# Patient Record
Sex: MALE | Race: OTHER | HISPANIC OR LATINO | Employment: FULL TIME | URBAN - METROPOLITAN AREA
[De-identification: names, ages, dates, MRNs, and addresses within clinical notes are randomized per-mention and may not be internally consistent; named-entity substitution may affect disease eponyms.]

---

## 2018-11-09 ENCOUNTER — OFFICE VISIT (OUTPATIENT)
Dept: MEDICAL GROUP | Facility: PHYSICIAN GROUP | Age: 28
End: 2018-11-09

## 2018-11-09 VITALS
OXYGEN SATURATION: 98 % | DIASTOLIC BLOOD PRESSURE: 92 MMHG | HEIGHT: 69 IN | TEMPERATURE: 100 F | HEART RATE: 104 BPM | WEIGHT: 165.8 LBS | RESPIRATION RATE: 16 BRPM | SYSTOLIC BLOOD PRESSURE: 130 MMHG | BODY MASS INDEX: 24.56 KG/M2

## 2018-11-09 DIAGNOSIS — M79.10 MYALGIA: ICD-10-CM

## 2018-11-09 DIAGNOSIS — R01.1 MURMUR: ICD-10-CM

## 2018-11-09 DIAGNOSIS — R03.0 ELEVATED BLOOD PRESSURE READING IN OFFICE WITHOUT DIAGNOSIS OF HYPERTENSION: ICD-10-CM

## 2018-11-09 DIAGNOSIS — R00.2 PALPITATIONS: ICD-10-CM

## 2018-11-09 DIAGNOSIS — R25.3 TWITCHING: ICD-10-CM

## 2018-11-09 PROBLEM — I10 ELEVATED BLOOD PRESSURE READING IN OFFICE WITH DIAGNOSIS OF HYPERTENSION: Status: RESOLVED | Noted: 2018-11-09 | Resolved: 2018-11-09

## 2018-11-09 PROBLEM — I10 ELEVATED BLOOD PRESSURE READING IN OFFICE WITH DIAGNOSIS OF HYPERTENSION: Status: ACTIVE | Noted: 2018-11-09

## 2018-11-09 PROCEDURE — 99204 OFFICE O/P NEW MOD 45 MIN: CPT | Performed by: FAMILY MEDICINE

## 2018-11-09 ASSESSMENT — PATIENT HEALTH QUESTIONNAIRE - PHQ9: CLINICAL INTERPRETATION OF PHQ2 SCORE: 0

## 2018-11-09 ASSESSMENT — PAIN SCALES - GENERAL: PAINLEVEL: NO PAIN

## 2018-11-09 NOTE — ASSESSMENT & PLAN NOTE
This is a new problem.  He states been getting daily twitching for the last 6 months. It started in his R eye intermittently for a month. It stopped and a month later he had no symptoms. Then after that he developed twitching in his R arm. He would notice it with leaning on it or randomly. Then he had an episode where he got really angry at his mom and stressed for the next couple days worried he would lose his job. After that he got twitching in his left arm, calf, R thigh, and his neck. One time he had twitching in his chin and his eye again. Those twitching lasted for a month. He would also get a tingling sensation in his L cheek, lasted a couple of days then has stopped and not returned. He started taking magnesium for twitching and noticed improvement in his eye twitching. All of his twitching has mellowed out but still present. He has also noticed twitching after exercise, the following day. He is concerned about a vitamin D deficiency and states he has a h/o that. He started taking vitamin D supplement a few weeks ago.

## 2018-11-09 NOTE — ASSESSMENT & PLAN NOTE
This is a new problem.  He describes these been getting stabbing body pains intermittently for the last 3 months. 1 time in the R hamstring, 1 time R chest, 1 time L chest, 1 time in his R toes, 1 time in his lower leg. They last only a few seconds. He states they are kind of rare.

## 2018-11-09 NOTE — ASSESSMENT & PLAN NOTE
"This is a new problem.  He states is been getting palpitations intermittently for the last 3 months. It feels like \"someone is inserting air into my chest\". Or like a skipping beat. This occurred after taking magnesium chewables. The last episode was a week ago. Prior to that, it would occur every few days. It occurs it gets really excited about something, other times randomly. He also states he has a bad diet.   "

## 2018-11-09 NOTE — PROGRESS NOTES
"Subjective:     CC:  Diagnoses of Myalgia, Palpitations, and Twitching were pertinent to this visit.    HISTORY OF THE PRESENT ILLNESS: Patient is a 28 y.o. male. This pleasant patient is here today here today to establish care and discuss multiple complaints, see details below.    Myalgia  This is a new problem.  He describes these been getting stabbing body pains intermittently for the last 3 months. 1 time in the R hamstring, 1 time R chest, 1 time L chest, 1 time in his R toes, 1 time in his lower leg. They last only a few seconds. He states they are kind of rare.     Palpitations  This is a new problem.  He states is been getting palpitations intermittently for the last 3 months. It feels like \"someone is inserting air into my chest\". Or like a skipping beat. This occurred after taking magnesium chewables. The last episode was a week ago. Prior to that, it would occur every few days. It occurs it gets really excited about something, other times randomly. He also states he has a bad diet.     Twitching  This is a new problem.  He states been getting daily twitching for the last 6 months. It started in his R eye intermittently for a month. It stopped and a month later he had no symptoms. Then after that he developed twitching in his R arm. He would notice it with leaning on it or randomly. Then he had an episode where he got really angry at his mom and stressed for the next couple days worried he would lose his job. After that he got twitching in his left arm, calf, R thigh, and his neck. One time he had twitching in his chin and his eye again. Those twitching lasted for a month. He would also get a tingling sensation in his L cheek, lasted a couple of days then has stopped and not returned. He started taking magnesium for twitching and noticed improvement in his eye twitching. All of his twitching has mellowed out but still present. He has also noticed twitching after exercise, the following day. He is concerned " "about a vitamin D deficiency and states he has a h/o that. He started taking vitamin D supplement a few weeks ago.      Allergies: Patient has no allergy information on record.    No current [facility]-ordered outpatient prescriptions on file.     No current [facility]-ordered facility-administered medications on file.        History reviewed. No pertinent past medical history.    Past Surgical History:   Procedure Laterality Date   • DENTAL EXTRACTION(S)  2004       Social History   Substance Use Topics   • Smoking status: Never Smoker   • Smokeless tobacco: Never Used   • Alcohol use 0.6 oz/week     1 Cans of beer per week       Social History     Social History Narrative   • No narrative on file       Family History   Problem Relation Age of Onset   • Diabetes Mother    • Diabetes Father    • Heart Disease Father    • No Known Problems Brother    • Cancer Maternal Grandmother         pancreatic   • Stroke Paternal Grandfather        Health Maintenance: Completed    ROS:   Gen: no fevers/chills  Eyes: no changes in vision  ENT: + sore throat  Pulm: + cough  CV: + chest pain - see hpi, + palpitations - see hpi  GI: no nausea/vomiting, no diarrhea  : no dysuria  MSk: + myalgias - see hpi  Skin: no rash  Neuro: no headaches      Objective:     Exam: Blood pressure 138/98, pulse (!) 112, temperature 37.8 °C (100 °F), temperature source Temporal, resp. rate 16, height 1.74 m (5' 8.5\"), weight 75.2 kg (165 lb 12.8 oz), SpO2 98 %. Body mass index is 24.84 kg/m².    General: Normal appearing. No distress.  HEENT: Normocephalic. Eyes conjunctiva clear lids without ptosis, pupils equal  and reactive to light accommodation, ears normal shape and contour, canals are clear bilaterally, tympanic membranes are benign, oropharynx is without erythema, edema or exudates.   Neck: Supple without JVD. Thyroid is not enlarged.  Pulmonary: Clear to ausculation.  Normal effort. No rales, ronchi, or wheezing.  Cardiovascular: Regular rate and " "rhythm without murmur. Carotid and radial pulses are intact and equal bilaterally.  Chest: nontender to palpation  Abdomen: Soft, nontender, nondistended. Normal bowel sounds. Liver and spleen are not palpable  Neurologic: Grossly nonfocal  Lymph: No cervical or supraclavicular lymph nodes are palpable  Skin: Warm and dry.  No obvious lesions.  Musculoskeletal: Normal gait. No extremity cyanosis, clubbing, or edema.  Psych: Normal mood and affect. Alert and oriented x3. Judgment and insight is normal.    Assessment & Plan:   28 y.o. male with the following -    1. Myalgia  This is a new problem.  He started getting sharp stabbing pains intermittently in different parts of his body approximately 3 months ago.  He describes that one episode was in the right hamstring, one episode in his right chest, one episode in his left chest, one episode in his right toes, and one episode in his right lower leg along the tibia.  These episodes only last for a few seconds and he states they are fairly rare and they seem to be improving.  I cannot reproduce the chest pain with palpation today.  We discussed getting lab work  including magnesium and creatine kinase.  He would like to start with just the basic lab work and magnesium and hold off on the creatine kinase for now.  - COMP METABOLIC PANEL; Future  - MAGNESIUM; Future  - VITAMIN D,25 HYDROXY; Future    2. Palpitations  This is a new problem.  For the last 3 months he has been getting the intermittent sensation of his heart \"skipping a beat\" or \"someone is inserting air into my chest\".  He started taking magnesium for the twitching (see below) and then switched to a chewable version of the magnesium and after that he developed these heart palpitations.  His last episode was a week ago but prior to that they were occurring every few days.  He does note that he will have that skipping beat sensation if he gets really excited about something but other times it is random.  He " thinks it may be related to his very poor diet as he admits he does not have a healthy diet.  He did have an elevated heart rate initially on exam today at 112.  At the end of the visit his heart rate was still elevated at 104 but improved.  He admits that he is a little anxious/nervous about the doctor appointment which might be contributing to his tachycardia.  We will continue to monitor closely.    3. Twitching  This is a new problem with a fairly complicated story.  Approximately 6 months ago he noticed that his right eye would twitch and it did this for about a month.  It then stopped and it lasted for a month without any twitching and then he developed twitching in his right arm.  This twitching would occur when he was leaning on it or more randomly.  Then following that, he got very angry with his mother as she was going to make him late for work and he became very stressed/worried about losing his job from being late to work.  Following that episode he then developed twitching in his left arm, left calf, right thigh, and his neck that was intermittent and would move around.  He also developed twitching in his chin and his eye again.  These lasted for a month.  He started taking magnesium as stated above and he noticed improvement in his eye twitching as he has not had any eye twitching since in the last month.  He still gets intermittent twitching in other body parts but he states that it slowed down and seems to be improving on its own.  He also did describe some left cheek tingling that lasted for a couple days and then went away and has not returned.  He will also note that the twitching occurs after exercise the following day.  He is concerned that he might have a vitamin D deficiency as he had that in the past and he thinks this might be a symptom of it.  We will check a magnesium level and vitamin D level.  - MAGNESIUM; Future  - VITAMIN D,25 HYDROXY; Future    4. Murmur  This was noted on exam today.   He has never had a murmur mentioned to him before.  He denies any chest pain, shortness of breath, dizziness/lightheadedness/syncope.  We will continue to monitor closely.    5.  Elevated blood pressure reading in office without diagnosis of hypertension  This was noted on exam today.  His initial blood pressure reading was 138/90 and repeat at the end of the visit was still elevated at 130/92.  He states that he checks his blood pressure periodically at the pharmacy and that it is usually on the high side of normal.  We will continue to monitor this closely.    6. Uninsured  Do to the fact that he is uninsured we discussed that he should call around to the different lab facilities to find the best price to get his lab work done.  Additionally we discussed that there is [facility] and [facility] which are federally qualified [facility] that can provide a sliding fee scale for any services provided which might be more affordable than coming here.  In light of this, we will hold off on scheduling a follow-up appointment today.  I will give him his lab results when they appear in my chart.  For now he is going to see if he can go to the other clinics but schedule with us later if he needs to.  The plan is to have him follow-up in a couple months to ensure resolution/improvement of his symptoms.    Please note that this dictation was created using voice recognition software. I have made every reasonable attempt to correct obvious errors, but I expect that there are errors of grammar and possibly content that I did not discover before finalizing the note.

## 2018-11-17 LAB
25(OH)D3+25(OH)D2 SERPL-MCNC: 17.6 NG/ML (ref 30–100)
ALBUMIN SERPL-MCNC: 5 G/DL (ref 3.5–5.5)
ALBUMIN/GLOB SERPL: 1.8 {RATIO} (ref 1.2–2.2)
ALP SERPL-CCNC: 75 IU/L (ref 39–117)
ALT SERPL-CCNC: 45 IU/L (ref 0–44)
AST SERPL-CCNC: 27 IU/L (ref 0–40)
BILIRUB SERPL-MCNC: 0.6 MG/DL (ref 0–1.2)
BUN SERPL-MCNC: 9 MG/DL (ref 6–20)
BUN/CREAT SERPL: 12 (ref 9–20)
CALCIUM SERPL-MCNC: 10 MG/DL (ref 8.7–10.2)
CHLORIDE SERPL-SCNC: 100 MMOL/L (ref 96–106)
CO2 SERPL-SCNC: 26 MMOL/L (ref 20–29)
CREAT SERPL-MCNC: 0.75 MG/DL (ref 0.76–1.27)
GLOBULIN SER CALC-MCNC: 2.8 G/DL (ref 1.5–4.5)
GLUCOSE SERPL-MCNC: 106 MG/DL (ref 65–99)
IF AFRICAN AMERICAN  100797: 142 ML/MIN/1.73
IF NON AFRICAN AMER 100791: 123 ML/MIN/1.73
MAGNESIUM SERPL-MCNC: 2.1 MG/DL (ref 1.6–2.3)
POTASSIUM SERPL-SCNC: 4.2 MMOL/L (ref 3.5–5.2)
PROT SERPL-MCNC: 7.8 G/DL (ref 6–8.5)
SODIUM SERPL-SCNC: 141 MMOL/L (ref 134–144)

## 2020-09-10 ENCOUNTER — APPOINTMENT (OUTPATIENT)
Dept: RADIOLOGY | Facility: MEDICAL CENTER | Age: 30
End: 2020-09-10
Attending: EMERGENCY MEDICINE

## 2020-09-10 ENCOUNTER — HOSPITAL ENCOUNTER (OUTPATIENT)
Facility: MEDICAL CENTER | Age: 30
End: 2020-09-11
Attending: EMERGENCY MEDICINE | Admitting: HOSPITALIST

## 2020-09-10 DIAGNOSIS — R55 NEAR SYNCOPE: ICD-10-CM

## 2020-09-10 DIAGNOSIS — R00.0 TACHYCARDIA: ICD-10-CM

## 2020-09-10 DIAGNOSIS — R00.2 PALPITATIONS: ICD-10-CM

## 2020-09-10 DIAGNOSIS — R42 LIGHTHEADEDNESS: ICD-10-CM

## 2020-09-10 LAB
ALBUMIN SERPL BCP-MCNC: 4.9 G/DL (ref 3.2–4.9)
ALBUMIN/GLOB SERPL: 1.6 G/DL
ALP SERPL-CCNC: 81 U/L (ref 30–99)
ALT SERPL-CCNC: 54 U/L (ref 2–50)
AMPHET UR QL SCN: NEGATIVE
ANION GAP SERPL CALC-SCNC: 18 MMOL/L (ref 7–16)
AST SERPL-CCNC: 28 U/L (ref 12–45)
BARBITURATES UR QL SCN: NEGATIVE
BASOPHILS # BLD AUTO: 0.8 % (ref 0–1.8)
BASOPHILS # BLD: 0.07 K/UL (ref 0–0.12)
BENZODIAZ UR QL SCN: NEGATIVE
BILIRUB SERPL-MCNC: 0.3 MG/DL (ref 0.1–1.5)
BUN SERPL-MCNC: 12 MG/DL (ref 8–22)
BZE UR QL SCN: NEGATIVE
CALCIUM SERPL-MCNC: 9.8 MG/DL (ref 8.4–10.2)
CANNABINOIDS UR QL SCN: NEGATIVE
CHLORIDE SERPL-SCNC: 97 MMOL/L (ref 96–112)
CO2 SERPL-SCNC: 23 MMOL/L (ref 20–33)
CREAT SERPL-MCNC: 0.69 MG/DL (ref 0.5–1.4)
D DIMER PPP IA.FEU-MCNC: <0.27 UG/ML (FEU) (ref 0–0.5)
EKG IMPRESSION: NORMAL
EOSINOPHIL # BLD AUTO: 0.17 K/UL (ref 0–0.51)
EOSINOPHIL NFR BLD: 1.9 % (ref 0–6.9)
ERYTHROCYTE [DISTWIDTH] IN BLOOD BY AUTOMATED COUNT: 40.3 FL (ref 35.9–50)
GLOBULIN SER CALC-MCNC: 3.1 G/DL (ref 1.9–3.5)
GLUCOSE SERPL-MCNC: 163 MG/DL (ref 65–99)
HCT VFR BLD AUTO: 46.1 % (ref 42–52)
HGB BLD-MCNC: 15.9 G/DL (ref 14–18)
IMM GRANULOCYTES # BLD AUTO: 0.05 K/UL (ref 0–0.11)
IMM GRANULOCYTES NFR BLD AUTO: 0.6 % (ref 0–0.9)
LYMPHOCYTES # BLD AUTO: 2.94 K/UL (ref 1–4.8)
LYMPHOCYTES NFR BLD: 33.1 % (ref 22–41)
MAGNESIUM SERPL-MCNC: 1.9 MG/DL (ref 1.5–2.5)
MCH RBC QN AUTO: 30.2 PG (ref 27–33)
MCHC RBC AUTO-ENTMCNC: 34.5 G/DL (ref 33.7–35.3)
MCV RBC AUTO: 87.5 FL (ref 81.4–97.8)
METHADONE UR QL SCN: NEGATIVE
MONOCYTES # BLD AUTO: 0.64 K/UL (ref 0–0.85)
MONOCYTES NFR BLD AUTO: 7.2 % (ref 0–13.4)
NEUTROPHILS # BLD AUTO: 5.02 K/UL (ref 1.82–7.42)
NEUTROPHILS NFR BLD: 56.4 % (ref 44–72)
NRBC # BLD AUTO: 0 K/UL
NRBC BLD-RTO: 0 /100 WBC
OPIATES UR QL SCN: NEGATIVE
OXYCODONE UR QL SCN: NEGATIVE
PCP UR QL SCN: NEGATIVE
PLATELET # BLD AUTO: 279 K/UL (ref 164–446)
PMV BLD AUTO: 10.2 FL (ref 9–12.9)
POTASSIUM SERPL-SCNC: 3.3 MMOL/L (ref 3.6–5.5)
PROPOXYPH UR QL SCN: NEGATIVE
PROT SERPL-MCNC: 8 G/DL (ref 6–8.2)
RBC # BLD AUTO: 5.27 M/UL (ref 4.7–6.1)
SODIUM SERPL-SCNC: 138 MMOL/L (ref 135–145)
T4 FREE SERPL-MCNC: 1.22 NG/DL (ref 0.93–1.7)
TROPONIN T SERPL-MCNC: <6 NG/L (ref 6–19)
TSH SERPL DL<=0.005 MIU/L-ACNC: 0.91 UIU/ML (ref 0.38–5.33)
WBC # BLD AUTO: 8.9 K/UL (ref 4.8–10.8)

## 2020-09-10 PROCEDURE — 83036 HEMOGLOBIN GLYCOSYLATED A1C: CPT

## 2020-09-10 PROCEDURE — 85025 COMPLETE CBC W/AUTO DIFF WBC: CPT

## 2020-09-10 PROCEDURE — 700105 HCHG RX REV CODE 258: Performed by: EMERGENCY MEDICINE

## 2020-09-10 PROCEDURE — 71045 X-RAY EXAM CHEST 1 VIEW: CPT

## 2020-09-10 PROCEDURE — 99285 EMERGENCY DEPT VISIT HI MDM: CPT

## 2020-09-10 PROCEDURE — 85379 FIBRIN DEGRADATION QUANT: CPT

## 2020-09-10 PROCEDURE — 36415 COLL VENOUS BLD VENIPUNCTURE: CPT

## 2020-09-10 PROCEDURE — 84439 ASSAY OF FREE THYROXINE: CPT

## 2020-09-10 PROCEDURE — 80061 LIPID PANEL: CPT

## 2020-09-10 PROCEDURE — 93005 ELECTROCARDIOGRAM TRACING: CPT | Performed by: EMERGENCY MEDICINE

## 2020-09-10 PROCEDURE — 80307 DRUG TEST PRSMV CHEM ANLYZR: CPT

## 2020-09-10 PROCEDURE — 84484 ASSAY OF TROPONIN QUANT: CPT

## 2020-09-10 PROCEDURE — 700111 HCHG RX REV CODE 636 W/ 250 OVERRIDE (IP): Performed by: EMERGENCY MEDICINE

## 2020-09-10 PROCEDURE — 83735 ASSAY OF MAGNESIUM: CPT

## 2020-09-10 PROCEDURE — 96374 THER/PROPH/DIAG INJ IV PUSH: CPT

## 2020-09-10 PROCEDURE — 80053 COMPREHEN METABOLIC PANEL: CPT

## 2020-09-10 PROCEDURE — 84443 ASSAY THYROID STIM HORMONE: CPT

## 2020-09-10 RX ORDER — SODIUM CHLORIDE 9 MG/ML
1000 INJECTION, SOLUTION INTRAVENOUS ONCE
Status: COMPLETED | OUTPATIENT
Start: 2020-09-10 | End: 2020-09-10

## 2020-09-10 RX ORDER — LORAZEPAM 2 MG/ML
0.5 INJECTION INTRAMUSCULAR ONCE
Status: COMPLETED | OUTPATIENT
Start: 2020-09-10 | End: 2020-09-10

## 2020-09-10 RX ADMIN — SODIUM CHLORIDE 1000 ML: 9 INJECTION, SOLUTION INTRAVENOUS at 22:15

## 2020-09-10 RX ADMIN — LORAZEPAM 0.5 MG: 2 INJECTION INTRAMUSCULAR; INTRAVENOUS at 22:15

## 2020-09-11 ENCOUNTER — APPOINTMENT (OUTPATIENT)
Dept: CARDIOLOGY | Facility: MEDICAL CENTER | Age: 30
End: 2020-09-11
Attending: HOSPITALIST

## 2020-09-11 VITALS
BODY MASS INDEX: 26.73 KG/M2 | DIASTOLIC BLOOD PRESSURE: 75 MMHG | WEIGHT: 176.37 LBS | SYSTOLIC BLOOD PRESSURE: 115 MMHG | RESPIRATION RATE: 18 BRPM | HEIGHT: 68 IN | HEART RATE: 92 BPM | OXYGEN SATURATION: 93 % | TEMPERATURE: 98.4 F

## 2020-09-11 PROBLEM — E87.6 HYPOKALEMIA: Status: ACTIVE | Noted: 2020-09-11

## 2020-09-11 PROBLEM — R73.09 ELEVATED GLUCOSE: Status: ACTIVE | Noted: 2020-09-11

## 2020-09-11 PROBLEM — R00.0 SINUS TACHYCARDIA: Status: ACTIVE | Noted: 2020-09-11

## 2020-09-11 PROBLEM — R00.0 SINUS TACHYCARDIA: Status: RESOLVED | Noted: 2020-09-11 | Resolved: 2020-09-11

## 2020-09-11 PROBLEM — R07.89 ATYPICAL CHEST PAIN: Status: ACTIVE | Noted: 2020-09-11

## 2020-09-11 LAB
CHOLEST SERPL-MCNC: 246 MG/DL (ref 100–199)
COVID ORDER STATUS COVID19: NORMAL
EST. AVERAGE GLUCOSE BLD GHB EST-MCNC: 126 MG/DL
HBA1C MFR BLD: 6 % (ref 0–5.6)
HDLC SERPL-MCNC: 31 MG/DL
LDLC SERPL CALC-MCNC: ABNORMAL MG/DL
LV EJECT FRACT  99904: 60
LV EJECT FRACT MOD 2C 99903: 60.99
LV EJECT FRACT MOD 4C 99902: 55.49
LV EJECT FRACT MOD BP 99901: 59.01
SARS-COV-2 RNA RESP QL NAA+PROBE: NOTDETECTED
SPECIMEN SOURCE: NORMAL
TRIGL SERPL-MCNC: 419 MG/DL (ref 0–149)
TROPONIN T SERPL-MCNC: 7 NG/L (ref 6–19)

## 2020-09-11 PROCEDURE — 700102 HCHG RX REV CODE 250 W/ 637 OVERRIDE(OP): Performed by: NURSE PRACTITIONER

## 2020-09-11 PROCEDURE — U0003 INFECTIOUS AGENT DETECTION BY NUCLEIC ACID (DNA OR RNA); SEVERE ACUTE RESPIRATORY SYNDROME CORONAVIRUS 2 (SARS-COV-2) (CORONAVIRUS DISEASE [COVID-19]), AMPLIFIED PROBE TECHNIQUE, MAKING USE OF HIGH THROUGHPUT TECHNOLOGIES AS DESCRIBED BY CMS-2020-01-R: HCPCS

## 2020-09-11 PROCEDURE — G0378 HOSPITAL OBSERVATION PER HR: HCPCS

## 2020-09-11 PROCEDURE — 700102 HCHG RX REV CODE 250 W/ 637 OVERRIDE(OP): Performed by: HOSPITALIST

## 2020-09-11 PROCEDURE — 93306 TTE W/DOPPLER COMPLETE: CPT

## 2020-09-11 PROCEDURE — C9803 HOPD COVID-19 SPEC COLLECT: HCPCS | Performed by: HOSPITALIST

## 2020-09-11 PROCEDURE — 99236 HOSP IP/OBS SAME DATE HI 85: CPT | Performed by: HOSPITALIST

## 2020-09-11 PROCEDURE — 93306 TTE W/DOPPLER COMPLETE: CPT | Mod: 26 | Performed by: INTERNAL MEDICINE

## 2020-09-11 PROCEDURE — 700101 HCHG RX REV CODE 250: Performed by: HOSPITALIST

## 2020-09-11 PROCEDURE — A9270 NON-COVERED ITEM OR SERVICE: HCPCS | Performed by: HOSPITALIST

## 2020-09-11 PROCEDURE — A9270 NON-COVERED ITEM OR SERVICE: HCPCS | Performed by: NURSE PRACTITIONER

## 2020-09-11 PROCEDURE — 84484 ASSAY OF TROPONIN QUANT: CPT

## 2020-09-11 RX ORDER — METOPROLOL TARTRATE 1 MG/ML
5 INJECTION, SOLUTION INTRAVENOUS ONCE
Status: COMPLETED | OUTPATIENT
Start: 2020-09-11 | End: 2020-09-11

## 2020-09-11 RX ORDER — AMOXICILLIN 250 MG
2 CAPSULE ORAL 2 TIMES DAILY
Status: DISCONTINUED | OUTPATIENT
Start: 2020-09-11 | End: 2020-09-11 | Stop reason: HOSPADM

## 2020-09-11 RX ORDER — POLYETHYLENE GLYCOL 3350 17 G/17G
1 POWDER, FOR SOLUTION ORAL
Status: DISCONTINUED | OUTPATIENT
Start: 2020-09-11 | End: 2020-09-11 | Stop reason: HOSPADM

## 2020-09-11 RX ORDER — POTASSIUM CHLORIDE 20 MEQ/1
40 TABLET, EXTENDED RELEASE ORAL ONCE
Status: COMPLETED | OUTPATIENT
Start: 2020-09-11 | End: 2020-09-11

## 2020-09-11 RX ORDER — BISACODYL 10 MG
10 SUPPOSITORY, RECTAL RECTAL
Status: DISCONTINUED | OUTPATIENT
Start: 2020-09-11 | End: 2020-09-11 | Stop reason: HOSPADM

## 2020-09-11 RX ORDER — SIMVASTATIN 20 MG
20 TABLET ORAL EVERY EVENING
Qty: 30 TAB | Refills: 11 | Status: SHIPPED
Start: 2020-09-11 | End: 2020-10-26 | Stop reason: SINTOL

## 2020-09-11 RX ORDER — SIMVASTATIN 20 MG
20 TABLET ORAL EVERY EVENING
Status: DISCONTINUED | OUTPATIENT
Start: 2020-09-11 | End: 2020-09-11 | Stop reason: HOSPADM

## 2020-09-11 RX ORDER — ACETAMINOPHEN 325 MG/1
650 TABLET ORAL EVERY 6 HOURS PRN
Status: DISCONTINUED | OUTPATIENT
Start: 2020-09-11 | End: 2020-09-11 | Stop reason: HOSPADM

## 2020-09-11 RX ADMIN — METOPROLOL TARTRATE 5 MG: 1 INJECTION, SOLUTION INTRAVENOUS at 02:21

## 2020-09-11 RX ADMIN — POTASSIUM CHLORIDE 40 MEQ: 1500 TABLET, EXTENDED RELEASE ORAL at 09:10

## 2020-09-11 RX ADMIN — ASPIRIN 81 MG: 81 TABLET, COATED ORAL at 01:40

## 2020-09-11 SDOH — HEALTH STABILITY: MENTAL HEALTH: HOW OFTEN DO YOU HAVE A DRINK CONTAINING ALCOHOL?: MONTHLY OR LESS

## 2020-09-11 SDOH — ECONOMIC STABILITY: FOOD INSECURITY: WITHIN THE PAST 12 MONTHS, YOU WORRIED THAT YOUR FOOD WOULD RUN OUT BEFORE YOU GOT MONEY TO BUY MORE.: NEVER TRUE

## 2020-09-11 SDOH — ECONOMIC STABILITY: FOOD INSECURITY: WITHIN THE PAST 12 MONTHS, THE FOOD YOU BOUGHT JUST DIDN'T LAST AND YOU DIDN'T HAVE MONEY TO GET MORE.: NEVER TRUE

## 2020-09-11 SDOH — ECONOMIC STABILITY: TRANSPORTATION INSECURITY
IN THE PAST 12 MONTHS, HAS THE LACK OF TRANSPORTATION KEPT YOU FROM MEDICAL APPOINTMENTS OR FROM GETTING MEDICATIONS?: NO

## 2020-09-11 SDOH — ECONOMIC STABILITY: TRANSPORTATION INSECURITY
IN THE PAST 12 MONTHS, HAS LACK OF TRANSPORTATION KEPT YOU FROM MEETINGS, WORK, OR FROM GETTING THINGS NEEDED FOR DAILY LIVING?: NO

## 2020-09-11 ASSESSMENT — COGNITIVE AND FUNCTIONAL STATUS - GENERAL
MOBILITY SCORE: 24
SUGGESTED CMS G CODE MODIFIER MOBILITY: CH
SUGGESTED CMS G CODE MODIFIER DAILY ACTIVITY: CH
DAILY ACTIVITIY SCORE: 24

## 2020-09-11 ASSESSMENT — LIFESTYLE VARIABLES
HAVE YOU EVER FELT YOU SHOULD CUT DOWN ON YOUR DRINKING: NO
TOTAL SCORE: 0
ON A TYPICAL DAY WHEN YOU DRINK ALCOHOL HOW MANY DRINKS DO YOU HAVE: 0
TOTAL SCORE: 0
EVER HAD A DRINK FIRST THING IN THE MORNING TO STEADY YOUR NERVES TO GET RID OF A HANGOVER: NO
EVER FELT BAD OR GUILTY ABOUT YOUR DRINKING: NO
ALCOHOL_USE: NO
AVERAGE NUMBER OF DAYS PER WEEK YOU HAVE A DRINK CONTAINING ALCOHOL: 0
HOW MANY TIMES IN THE PAST YEAR HAVE YOU HAD 5 OR MORE DRINKS IN A DAY: 0
CONSUMPTION TOTAL: NEGATIVE
TOTAL SCORE: 0
HAVE PEOPLE ANNOYED YOU BY CRITICIZING YOUR DRINKING: NO

## 2020-09-11 ASSESSMENT — ENCOUNTER SYMPTOMS
NAUSEA: 0
SHORTNESS OF BREATH: 0
FEVER: 0
INSOMNIA: 0
NECK PAIN: 0
DEPRESSION: 0
SORE THROAT: 0
HEADACHES: 0
COUGH: 0
BRUISES/BLEEDS EASILY: 0
DOUBLE VISION: 0
BLURRED VISION: 0
VOMITING: 0
DIZZINESS: 0
MYALGIAS: 0
WEAKNESS: 0
PALPITATIONS: 1

## 2020-09-11 ASSESSMENT — PATIENT HEALTH QUESTIONNAIRE - PHQ9
1. LITTLE INTEREST OR PLEASURE IN DOING THINGS: NOT AT ALL
SUM OF ALL RESPONSES TO PHQ9 QUESTIONS 1 AND 2: 0
2. FEELING DOWN, DEPRESSED, IRRITABLE, OR HOPELESS: NOT AT ALL

## 2020-09-11 NOTE — CARE PLAN
Problem: Knowledge Deficit  Goal: Knowledge of disease process/condition, treatment plan, diagnostic tests, and medications will improve  Outcome: PROGRESSING AS EXPECTED  Note: Pt has new diagnosis of high cholesterol and starting on statins; needing extensive education on dietary choices.     Problem: Discharge Barriers/Planning  Goal: Patient's continuum of care needs will be met  Outcome: PROGRESSING AS EXPECTED  Note: Pt currently does not insurance and has not been to pcp in over 2 years; giving options for fee for services clinics in the area.

## 2020-09-11 NOTE — ASSESSMENT & PLAN NOTE
-Observation status on telemetry.  -With persistent sinus tachycardia at 135 bpm-in spite of IV fluids and Ativan  -Atypical chest pain likely due to palpitations and fast heart rate.  -I am checking serial cardiac enzymes every 4 hours x3.  -D-dimer negative.  -Received 1 round of Ativan as well  -Ordered echocardiogram in the morning

## 2020-09-11 NOTE — CARE PLAN
Problem: Communication  Goal: The ability to communicate needs accurately and effectively will improve  Outcome: PROGRESSING AS EXPECTED  Note: AO4 able to verbalize needs clearly. Demonstrates appropriate use of call light.        Problem: Knowledge Deficit  Goal: Knowledge of disease process/condition, treatment plan, diagnostic tests, and medications will improve  Outcome: PROGRESSING AS EXPECTED  Note:   Review plan of care including IV and medications, labs and tests, and discharge planning. Take extra time to reiterate to patients that they may ask questions at any time and should always let staff know if they are having difficulty breathing, pain or any discomfort at any time.

## 2020-09-11 NOTE — PROGRESS NOTES
"0130: Pt arrived to unit via gurney. Ambulated from gurney to bed by self. Tele monitor applied, vitals taken. Pt assessed. A&O x4. Admit profile and med rec complete. Discussed POC with pt, including tele, echo in AM. Welcome folder provided and discussed. Communication board filled out. Questions and concerns addressed, verbalized understanding. Fall precautions in place. Pt demonstrates ability to use call light appropriately.     Pt c/o mild dizziness when walking, no pain or SOB. On tele is ST 130s-140s, c/o \"pounding heart.\"    0221: IV metoprolol given.   0245: Heart rate has decreased after metoprolol, no SR ST 90s-100s. Pt reports feeling like heart is not pounding anymore. No c/o dizziness at this time.   0700: Report given to LEBRON Roberts.     "

## 2020-09-11 NOTE — ED TRIAGE NOTES
Pt  went out to walk with his brother tonight.  went didn't walk very far and started to feel dizzy and then his heart started pounding. States the episode only lasted 30 seconds but got worried so called the ambulance. Pt  has hx of palpitations but feels recently it has gotten worse. Denies drug or alcohol use

## 2020-09-11 NOTE — PROGRESS NOTES
Telemetry Shift Summary    Rhythm: SR ST  HR Range: 90-140s  Ectopy: Per Monitor Tech [place]:    No ectopy    Measurements for strip printed 9/11/2020 at 0301:        0.20/0.08/0.32        Normal Values  Rhythm SR  HR Range    Measurements 0.12-0.20 / 0.06-0.10  / 0.30-0.52

## 2020-09-11 NOTE — ASSESSMENT & PLAN NOTE
-On admission, glucose is 163.  I believe this is reactive, if remains elevated consider hemoglobin A1c.  No underlying history of type 2 diabetes.

## 2020-09-11 NOTE — DISCHARGE SUMMARY
Discharge Summary    CHIEF COMPLAINT ON ADMISSION  Chief Complaint   Patient presents with    Rapid Heart Beat    Dizziness       Reason for Admission  ems     Admission Date  9/10/2020    CODE STATUS  Full Code    HPI & HOSPITAL COURSE  This is a 29 y.o. male here with rapid heartbeat and dizziness.  Patient has no significant past medical history.  Patient presented to the emergency room after he states he developed dizziness while walking around his apartment.  Patient states dizziness increased significantly and he decided to call for an ambulance when he felt he was going to pass out.  Patient stated minimal chest pressure during episode.  On arrival in the emergency room patient was noted to have elevated heart rate ranging from 128 to 135 bpm.  Patient was provided 1 L IV fluid hydration and a half a milligram IV Ativan.  CBC was obtained which was essentially benign and noncontributory.  BMP noted to have slightly decreased potassium at 3.3 and elevated glucose at 163.  Troponin was negative,  d-dimer was negative.  EKG was obtained which shows sinus tachycardia with no acute ST elevation.  Patient was admitted to medical telemetry floor.  Patient was placed on telemetry monitoring with no acute cardiac events noted.  Patient's potassium was replaced p.o. lipid panel was obtained which showed markedly elevated triglycerides at 419 and elevated total cholesterol at 246.  Patient was initiated on statin therapy.  Echocardiogram was obtained which showed an EF of 60% otherwise normal.  COVID swab was negative.  TSH and free T4 within normal limits.  Patient is had no further episodes during hospitalization.  Signs are stable and patient is up ambulating independently.  Recommended patient follow-up with cardiology for possible Holter monitoring as patient states he has had previous dizzy episodes at home.  Patient need to follow-up with PCP for referral to cardiology.        Therefore, he is discharged in good  and stable condition to home with close outpatient follow-up.        Discharge Date  9/11/20    FOLLOW UP ITEMS POST DISCHARGE  Please follow up with PCP  Obtain referral for Cardiology for Holter monitor to assess for cardiac arrhythmias  Make dietary and lifestyle modifications to help with cholesterol levels.      DISCHARGE DIAGNOSES  Principal Problem:    Atypical chest pain POA: Unknown  Active Problems:    Sinus tachycardia POA: Unknown    Elevated glucose POA: Unknown    Hypokalemia POA: Unknown  Resolved Problems:    * No resolved hospital problems. *      FOLLOW UP    Roseanna Welsh M.D.  [address]  [facility] [address]  [phone]    In 1 week        MEDICATIONS ON DISCHARGE     Medication List        START taking these medications        Instructions   simvastatin 20 MG Tabs  Commonly known as: ZOCOR   Take 1 Tab by mouth every evening.  Dose: 20 mg              Allergies  No Known Allergies    DIET  Orders Placed This Encounter   Procedures    Diet Order Cardiac     Standing Status:   Standing     Number of Occurrences:   1     Order Specific Question:   Diet:     Answer:   Cardiac [6]       ACTIVITY  As tolerated.  Weight bearing as tolerated    CONSULTATIONS  None     PROCEDURES  None     LABORATORY  Lab Results   Component Value Date    SODIUM 138 09/10/2020    POTASSIUM 3.3 (L) 09/10/2020    CHLORIDE 97 09/10/2020    CO2 23 09/10/2020    GLUCOSE 163 (H) 09/10/2020    BUN 12 09/10/2020    CREATININE 0.69 09/10/2020        Lab Results   Component Value Date    WBC 8.9 09/10/2020    HEMOGLOBIN 15.9 09/10/2020    HEMATOCRIT 46.1 09/10/2020    PLATELETCT 279 09/10/2020        Total time of the discharge process exceeds 38 minutes.

## 2020-09-11 NOTE — DISCHARGE INSTRUCTIONS
Discharge Instructions    Discharged to home by car with relative. Discharged via walking, hospital escort: Yes.  Special equipment needed: Not Applicable    Be sure to schedule a follow-up appointment with your primary care doctor or any specialists as instructed.     Discharge Plan:        I understand that a diet low in cholesterol, fat, and sodium is recommended for good health. Unless I have been given specific instructions below for another diet, I accept this instruction as my diet prescription.   Other diet: Low Fat    Special Instructions: None    · Is patient discharged on Warfarin / Coumadin?   No     Depression / Suicide Risk    As you are discharged from this [facility] facility, it is important to learn how to keep safe from harming yourself.    Recognize the warning signs:  · Abrupt changes in personality, positive or negative- including increase in energy   · Giving away possessions  · Change in eating patterns- significant weight changes-  positive or negative  · Change in sleeping patterns- unable to sleep or sleeping all the time   · Unwillingness or inability to communicate  · Depression  · Unusual sadness, discouragement and loneliness  · Talk of wanting to die  · Neglect of personal appearance   · Rebelliousness- reckless behavior  · Withdrawal from people/activities they love  · Confusion- inability to concentrate     If you or a loved one observes any of these behaviors or has concerns about self-harm, here's what you can do:  · Talk about it- your feelings and reasons for harming yourself  · Remove any means that you might use to hurt yourself (examples: pills, rope, extension cords, firearm)  · Get professional help from the community (Mental Health, Substance Abuse, psychological counseling)  · Do not be alone:Call your Safe Contact- someone whom you trust who will be there for you.  · Call your local CRISIS HOTLINE 627-0352 or 136-882-5629  · Call your local Children's Mobile Crisis  Response Team [facility] (599) 518-5228 or www.RewardMyWay  · Call the toll free National Suicide Prevention Hotlines   · National Suicide Prevention Lifeline 819-898-OAAT (0461)  · National Hope Line Network 800-SUICIDE (798-2039)      Discharge Instructions per ALTON Robertson    Please follow up with PCP and obtain referral to Cardiology   Avoid energy drinks  Stay well hydrated   Dietary and lifestyle modifications   Take medications as prescribed   DIET: Low fat   ACTIVITY: As tolerated   DIAGNOSIS: Arrhythmia   Return to ER if rapid heart beat, increased dizziness, high fever, or shortness of breath    Arrhythmia Categories  Your heart is a muscle that works to pump blood through your body by regular contractions. The beating of your heart is controlled by a system of special pacemaker cells. These cells control the electrical activity of the heart. When the system controlling this regular beating is disturbed, a heart rhythm abnormality (arrhythmia) results.  WHEN YOUR HEART SKIPS A BEAT  One of the most common and least serious heart arrhythmias is called an ectopic or premature atrial heartbeat (PAC). This may be noticed as a small change in your regular pulse. A PAC originates from the top part (atrium) of the heart. Within the right atrium, the SA node is the area that normally controls the regularity of the heart. PACs occur in heart tissue outside of the SA node region. You may feel this as a skipped beat or heart flutter, especially if several occur in succession or occur frequently.   Another arrhythmia is ventricular premature complex (VCP or PVC). These extra beats start out in the bottom, more muscular chambers of the heart. In most cases a PVC is harmless. If there are underlying causes that are making the heart irritable such as an overactive thyroid or a prior heart attack PVCs may be of more concern. In a few cases, medications to control the heart rhythm may be  prescribed.  Things to try at home:  · Cut down or avoid alcohol, tobacco and caffeine.   · Get enough sleep.   · Reduce stress.   · Exercise more.   WHEN THE HEART BEATS TOO FAST  Atrial tachycardia is a fast heart rate, which starts out in the atrium. It may last from minutes to much longer. Your heart may beat 140 to 240 times per minute instead of the normal 60 to 100.  · Symptoms include a worried feeling (anxiety) and a sense that your heart is beating fast and hard.   · You may be able to stop the fast rate by holding your breath or bearing down as if you were going to have a bowel movement.   · This type of fast rate is usually not dangerous.   Atrial fibrillation and atrial flutter are other fast rhythms that start in the atria. Both conditions keep the atria from filling with enough blood so the heart does not work well.  · Symptoms include feeling lightheaded or faint.   · These fast rates may be the result of heart damage or disease. Too much thyroid hormone may play a role.   · There may be no clear cause or it may be from heart disease or damage.   · Medication or a special electrical treatment (cardioversion) may be needed to get the heart beating normally.   Ventricular tachycardia is a fast heart rate that starts in the lower muscular chambers (ventricles) This is a serious disorder that requires treatment as soon as possible. You need someone else to get and use a small defibrillator.  · Symptoms include collapse, chest pain, or being short of breath.   · Treatment may include medication, procedures to improve blood flow to the heart, or an implantable cardiac defibrillator (ICD).   DIAGNOSIS   · A cardiogram (EKG or ECG) will be done to see the arrhythmia, as well as lab tests to check the underlying cause.   · If the extra beats or fast rate come and go, you may wear a Holter monitor that records your heart rate for a longer period of time.   HOME CARE INSTRUCTIONS   If your caregiver has given  you a follow-up appointment, it is very important to keep that appointment. Not keeping the appointment could result in a heart attack, permanent injury, pain, and disability. If there is any problem keeping the appointment, you must call back to this facility for assistance.   SEEK MEDICAL CARE IF:  · You have irregular or fast heartbeats (palpitations).   · You experience skipped beats.   · You develop lightheadedness.   · You have chest discomfort.   · You develop shortness of breath.   · You have more frequent episodes, if you are already being treated.   SEEK IMMEDIATE MEDICAL CARE IF:   · You have severe chest pain, especially if the pain is crushing or pressure-like and spreads to the arms, back, neck, or jaw, or if you have sweating, feeling sick to your stomach (nausea), or shortness of breath. THIS IS AN EMERGENCY. Do not wait to see if the pain will go away. Get medical help at once. Call 911 or 0 (). DO NOT drive yourself to the hospital.   · You feel dizzy or faint.   · You have episodes of previously documented atrial tachycardia that do not resolve with the techniques your caregiver has taught you.   · Irregular or rapid heartbeats begin to occur more often than in the past, especially if they are associated with more pronounced symptoms or of longer duration.   Document Released: 12/18/2006 Document Revised: 03/11/2013 Document Reviewed: 05/01/2008  [employer]® Patient Information ©2013 [employer], [employer].

## 2020-09-11 NOTE — H&P
Hospital Medicine History & Physical Note    Date of Service  9/11/2020    Primary Care Physician  Roseanna Welsh M.D.    Consultants  None    Code Status  Full code    Chief Complaint  Chief Complaint   Patient presents with   • Rapid Heart Beat   • Dizziness       History of Presenting Illness  29 y.o. male, who has no significant past medical history, who presented to the ED on 9/10/2020 with rotations.  Symptoms a started when he was walking around his apartment.  He denies feeling the palpitations and getting progressively dizzy to the point of almost passing out at a times.  Additionally, he reports some chest discomfort.  Denies prior similar symptoms.  He has been mostly working from home on his computer, no sick contacts.  Denies having fever, cough, shortness of breath.  Patient denies caffeine intake, also denies any other recreational drug use.    ER COURSE:  -Patient has been tachycardic with heart rate ranging from 128 to 135 bpm here in the ED.  -Patient received 1 L IV fluids and 0.5 mg of Ativan IV without significant improvement.  Laboratories showed negative d-dimer, potassium of 3.3 and sugar of 163.  -EKG consistent with sinus tachycardia 131 bpm    Review of Systems  Review of Systems   Constitutional: Negative for fever.   HENT: Negative for congestion and sore throat.    Eyes: Negative for blurred vision and double vision.   Respiratory: Negative for cough and shortness of breath.    Cardiovascular: Positive for chest pain and palpitations.   Gastrointestinal: Negative for nausea and vomiting.   Genitourinary: Negative for dysuria and urgency.   Musculoskeletal: Negative for myalgias and neck pain.   Skin: Negative for itching and rash.   Neurological: Negative for dizziness, weakness and headaches.   Endo/Heme/Allergies: Does not bruise/bleed easily.   Psychiatric/Behavioral: Negative for depression. The patient does not have insomnia.        Past Medical History  Denies    Surgical  History   has a past surgical history that includes dental extraction(s) (2004).     Family History  family history includes Cancer in his maternal grandmother; Diabetes in his father and mother; Heart Disease in his father; No Known Problems in his brother; Stroke in his paternal grandfather.     Social History   reports that he has never smoked. He has never used smokeless tobacco. He reports current alcohol use of about 0.6 oz of alcohol per week. He reports that he does not use drugs.    Allergies  No Known Allergies    Medications  None       Physical Exam  Temp:  [37.1 °C (98.7 °F)] 37.1 °C (98.7 °F)  Pulse:  [128-133] 133  Resp:  [19] 19  BP: (136-159)/() 136/77  SpO2:  [97 %-98 %] 98 %    Physical Exam  Constitutional:       Appearance: Normal appearance.   HENT:      Head: Normocephalic and atraumatic.      Nose: Nose normal.      Mouth/Throat:      Mouth: Mucous membranes are moist.      Pharynx: Oropharynx is clear.   Eyes:      Extraocular Movements: Extraocular movements intact.      Pupils: Pupils are equal, round, and reactive to light.   Neck:      Musculoskeletal: Normal range of motion and neck supple.   Cardiovascular:      Rate and Rhythm: Regular rhythm. Tachycardia present.      Pulses: Normal pulses.      Heart sounds: Murmur present.   Pulmonary:      Effort: Pulmonary effort is normal.      Breath sounds: Normal breath sounds.   Abdominal:      General: Abdomen is flat. Bowel sounds are normal.      Palpations: Abdomen is soft.   Skin:     General: Skin is warm and dry.   Neurological:      General: No focal deficit present.      Mental Status: He is alert and oriented to person, place, and time.   Psychiatric:         Mood and Affect: Mood normal.         Behavior: Behavior normal.         Laboratory:  Recent Labs     09/10/20  2100   WBC 8.9   RBC 5.27   HEMOGLOBIN 15.9   HEMATOCRIT 46.1   MCV 87.5   MCH 30.2   MCHC 34.5   RDW 40.3   PLATELETCT 279   MPV 10.2     Recent Labs      09/10/20  2100   SODIUM 138   POTASSIUM 3.3*   CHLORIDE 97   CO2 23   GLUCOSE 163*   BUN 12   CREATININE 0.69   CALCIUM 9.8     Recent Labs     09/10/20  2100   ALTSGPT 54*   ASTSGOT 28   ALKPHOSPHAT 81   TBILIRUBIN 0.3   GLUCOSE 163*         No results for input(s): NTPROBNP in the last 72 hours.      Recent Labs     09/10/20  2100   TROPONINT <6       Imaging:  DX-CHEST-PORTABLE (1 VIEW)    (Results Pending)     EKG: Sinus tachycardia 131 bpm.  Normal axis and no acute ST elevation.  There is some interpretation.    Assessment/Plan:  I anticipate this patient is appropriate for observation status at this time.    * Atypical chest pain  Assessment & Plan  -Observation status on telemetry.  -With persistent sinus tachycardia at 135 bpm-in spite of IV fluids and Ativan  -Atypical chest pain likely due to palpitations and fast heart rate.  -I am checking serial cardiac enzymes every 4 hours x3.  -D-dimer negative.  -Received 1 round of Ativan as well  -Ordered echocardiogram in the morning    Sinus tachycardia  Assessment & Plan  -Plan as above.    Hypokalemia  Assessment & Plan  -Replace electrolytes PRN    Elevated glucose  Assessment & Plan  -On admission, glucose is 163.  I believe this is reactive, if remains elevated consider hemoglobin A1c.  No underlying history of type 2 diabetes.    DVT prophylaxis: SCDs: He is a low risk.

## 2020-09-11 NOTE — ED PROVIDER NOTES
"ED Provider Note    CHIEF COMPLAINT  Chief Complaint   Patient presents with   • Rapid Heart Beat   • Dizziness       HPI  Patient is a 29-year-old, with no prior medical history who presents emergency room for evaluation of several episodes of palpitations, dizziness.  Today he was with the brother, going outside and decided to exercise when he felt short of breath, had the sensations of rapid heart rate and felt profoundly dizzy.  This lasted momentarily, abated with rest, and is now somewhat improved.  Though he still has a sensations of palpitations he denies chest pressure, has had resolution of his shortness of breath and is not currently dizzy while sitting in the bed.  He reports some intermittent episodes in the past for which she has not had any sort of work-up.  He denies a familial history of early onset cardiac disease and denies a first-degree relative who had any sudden cardiac death.    Denies excessive caffiene use, no energy drink use.  Denies drug or stimulant use    PPE Note: I personally donned full PPE for all patient encounters during this visit, including being clean-shaven with an N95 respirator mask, gloves, and goggles.     REVIEW OF SYSTEMS  See HPI for further details. All other systems are negative.     PAST MEDICAL HISTORY       SOCIAL HISTORY  Social History     Tobacco Use   • Smoking status: Never Smoker   • Smokeless tobacco: Never Used   Substance and Sexual Activity   • Alcohol use: Yes     Alcohol/week: 0.6 oz     Types: 1 Cans of beer per week     Comment: rarely   • Drug use: No   • Sexual activity: Not Currently       SURGICAL HISTORY   has a past surgical history that includes dental extraction(s) (2004).    CURRENT MEDICATIONS  Home Medications    **Home medications have not yet been reviewed for this encounter**       ALLERGIES  No Known Allergies    PHYSICAL EXAM  VITAL SIGNS: /77   Pulse (!) 133   Temp 37.1 °C (98.7 °F) (Temporal)   Resp 19   Ht 1.727 m (5' 8\") "   Wt 80 kg (176 lb 5.9 oz)   SpO2 98%   BMI 26.82 kg/m²   Pulse ox interpretation: I interpret this pulse ox as normal.  Genl: M sitting in chair comfortably, speaking clearly, appears anxious but in no acute distress   Head: NC/AT   ENT: Mucous membranes moist, posterior pharynx clear, uvula midline, nares patent bilaterally  Eyes: Normal sclera, pupils equal round reactive to light  Neck: Supple, FROM, no LAD appreciated  Pulmonary: Lungs are clear to auscultation bilaterally  Chest: No TTP  CV:  tachycardia, + systolic murmur, pulses 2+ in both upper and lower extremities,  Abdomen: soft, NT/ND; no rebound/guarding, no masses palpated, no HSM  : no CVA or suprapubic tenderness  Musculoskeletal: Pain free ROM of the neck. Moving upper and lower extremities and spontaneous in coordinated fashion  Neuro: A&Ox4 (person, place, time, situation), speech fluent, gait steady, no focal deficits appreciated, No cerebellar signs. Sensation is grossly intact in the distal upper and lower extremities. 5/5 strength in  and dorsiflexion/plantar flexion of the ankles  Psych: Patient has an appropriate affect and behavior  Skin: No rash or lesions.  No pallor or jaundice.  No cyanosis.  Warm and dry.     DIAGNOSTIC STUDIES / PROCEDURES    Results for orders placed or performed during the hospital encounter of 09/10/20   CBC w/ Differential   Result Value Ref Range    WBC 8.9 4.8 - 10.8 K/uL    RBC 5.27 4.70 - 6.10 M/uL    Hemoglobin 15.9 14.0 - 18.0 g/dL    Hematocrit 46.1 42.0 - 52.0 %    MCV 87.5 81.4 - 97.8 fL    MCH 30.2 27.0 - 33.0 pg    MCHC 34.5 33.7 - 35.3 g/dL    RDW 40.3 35.9 - 50.0 fL    Platelet Count 279 164 - 446 K/uL    MPV 10.2 9.0 - 12.9 fL    Neutrophils-Polys 56.40 44.00 - 72.00 %    Lymphocytes 33.10 22.00 - 41.00 %    Monocytes 7.20 0.00 - 13.40 %    Eosinophils 1.90 0.00 - 6.90 %    Basophils 0.80 0.00 - 1.80 %    Immature Granulocytes 0.60 0.00 - 0.90 %    Nucleated RBC 0.00 /100 WBC     Neutrophils (Absolute) 5.02 1.82 - 7.42 K/uL    Lymphs (Absolute) 2.94 1.00 - 4.80 K/uL    Monos (Absolute) 0.64 0.00 - 0.85 K/uL    Eos (Absolute) 0.17 0.00 - 0.51 K/uL    Baso (Absolute) 0.07 0.00 - 0.12 K/uL    Immature Granulocytes (abs) 0.05 0.00 - 0.11 K/uL    NRBC (Absolute) 0.00 K/uL   Complete Metabolic Panel (CMP)   Result Value Ref Range    Sodium 138 135 - 145 mmol/L    Potassium 3.3 (L) 3.6 - 5.5 mmol/L    Chloride 97 96 - 112 mmol/L    Co2 23 20 - 33 mmol/L    Anion Gap 18.0 (H) 7.0 - 16.0    Glucose 163 (H) 65 - 99 mg/dL    Bun 12 8 - 22 mg/dL    Creatinine 0.69 0.50 - 1.40 mg/dL    Calcium 9.8 8.4 - 10.2 mg/dL    AST(SGOT) 28 12 - 45 U/L    ALT(SGPT) 54 (H) 2 - 50 U/L    Alkaline Phosphatase 81 30 - 99 U/L    Total Bilirubin 0.3 0.1 - 1.5 mg/dL    Albumin 4.9 3.2 - 4.9 g/dL    Total Protein 8.0 6.0 - 8.2 g/dL    Globulin 3.1 1.9 - 3.5 g/dL    A-G Ratio 1.6 g/dL   Troponin STAT   Result Value Ref Range    Troponin T <6 6 - 19 ng/L   Magnesium   Result Value Ref Range    Magnesium 1.9 1.5 - 2.5 mg/dL   D-Dimer (only helpful in low pre-test probability wells critieria. Do not order if patient ruled out by PERC criteria. See Weblinks at top of Labs section)   Result Value Ref Range    D-Dimer Screen <0.27 0.00 - 0.50 ug/mL (FEU)   TSH   Result Value Ref Range    TSH 0.912 0.380 - 5.330 uIU/mL   URINE DRUG SCREEN   Result Value Ref Range    Amphetamines Urine Negative Negative    Barbiturates Negative Negative    Benzodiazepines Negative Negative    Cocaine Metabolite Negative Negative    Methadone Negative Negative    Opiates Negative Negative    Oxycodone Negative Negative    Phencyclidine -Pcp Negative Negative    Propoxyphene Negative Negative    Cannabinoid Metab Negative Negative   FREE THYROXINE   Result Value Ref Range    Free T-4 1.22 0.93 - 1.70 ng/dL   ESTIMATED GFR   Result Value Ref Range    GFR If African American >60 >60 mL/min/1.73 m 2    GFR If Non African American >60 >60 mL/min/1.73 m 2    EKG   Result Value Ref Range    Report       [facility] Emergency Dept[facility]    Test Date:  2020-09-10  Pt Name:    RAFAEL ULYSSES ZARAGOZA      [facility]  MRN:        [id number]                      Room:       [facility]  Gender:     Male                         Technician: NORY  :        [id number]                   Requested By:ARNULFO CASTILLO  Order #:    [id number]                    Reading MD:    Measurements  Intervals                                Axis  Rate:       132                          P:          38  NH:         155                          QRS:        57  QRSD:       93                           T:  QT:         294  QTc:        436    Interpretive Statements  Sinus tachycardia  Nonspecific repol abnormality, lateral leads  No previous ECG available for comparison       EKG  As interpreted by me at : This is a sinus rhythm, rate of 132, narrow complex, no NH prolongation, no QTC prolongation, without acute ischemia or infarction.  There is some J-point elevation noted in V1/V2, no apparent reciprocal changes noted at this time.  No prior for comparison.    LABS  Labs Reviewed   COMP METABOLIC PANEL - Abnormal; Notable for the following components:       Result Value    Potassium 3.3 (*)     Anion Gap 18.0 (*)     Glucose 163 (*)     ALT(SGPT) 54 (*)     All other components within normal limits    Narrative:     1. Age less then 50  2. Heart reate less then 100  3. 02 sat > 94%  4. No prior history of DVT or PE  5. No recent trauma or surgery (2 weeks)  6. No hemoptisis.  7. No  or HRP  8. No un-lateral leg swelling.   CBC WITH DIFFERENTIAL    Narrative:     1. Age less then 50  2. Heart reate less then 100  3. 02 sat > 94%  4. No prior history of DVT or PE  5. No recent trauma or surgery (2 weeks)  6. No hemoptisis.  7. No  or HRP  8. No un-lateral leg swelling.   TROPONIN    Narrative:     1. Age less then 50  2. Heart reate less then 100  3. 02 sat > 94%  4.  No prior history of DVT or PE  5. No recent trauma or surgery (2 weeks)  6. No hemoptisis.  7. No  or HRP  8. No un-lateral leg swelling.   MAGNESIUM    Narrative:     1. Age less then 50  2. Heart reate less then 100  3. 02 sat > 94%  4. No prior history of DVT or PE  5. No recent trauma or surgery (2 weeks)  6. No hemoptisis.  7. No  or HRP  8. No un-lateral leg swelling.   D-DIMER    Narrative:     1. Age less then 50  2. Heart reate less then 100  3. 02 sat > 94%  4. No prior history of DVT or PE  5. No recent trauma or surgery (2 weeks)  6. No hemoptisis.  7. No  or HRP  8. No un-lateral leg swelling.   TSH   URINE DRUG SCREEN   FREE THYROXINE   ESTIMATED GFR    Narrative:     1. Age less then 50  2. Heart reate less then 100  3. 02 sat > 94%  4. No prior history of DVT or PE  5. No recent trauma or surgery (2 weeks)  6. No hemoptisis.  7. No  or HRP  8. No un-lateral leg swelling.     RADIOLOGY  DX-CHEST-PORTABLE (1 VIEW)    (Results Pending)       COURSE & MEDICAL DECISION MAKING  Pertinent Labs & Imaging studies reviewed. (See chart for details)    DDX:  ACS  Pneumothorax  Pulmonary embolism  Aortic dissection  Pneumonia  Myocarditis  Endocarditis  Pancreatitis  GERD  Anxiety    MDM    Initial evaluation at 2130  Patient presents the emergency room for the complaints as described above.  My concern is for the minimal exertional symptomology, the dramatic tachycardia and the murmur that is easily appreciated.  Overall he has had scattered previous episodes without formal work-up and now has unresolving sinus tachycardia at rest without other explainable source.  He is not acutely septic, he has no elevated troponins and there is no identifiable cardiothoracic process on x-ray.  D-dimer is negative and he does not have pleuritic chest pain.  There is no acute evidence of right heart strain on EKG and CT scan is not performed.  He has no gross metabolic derangements, normal thyroid panel, no acute  anemia.    With the minimal exertional recreation of his symptoms, the increasing frequency the lack of formal work-up I would like to have the patient admitted for observation, echo and potential inpatient cardiology evaluation.  He remains acutely stable but has continued sinus tach despite resuscitation, administration of anxiolytic.  This discussion was had at the bedside and they are amenable to observation admission.  Discussed with the hospitalist who will evaluate the patient for admission.    HEART SCORE:3    HYDRATION: Based on the patient's presentation of Tachycardia the patient was given IV fluids. IV Hydration was used because oral hydration was not adequate alone. Upon recheck following hydration, the patient was stable.    FINAL IMPRESSION  Visit Diagnoses     ICD-10-CM   1. Palpitations  R00.2   2. Lightheadedness  R42   3. Near syncope  R55   4. Tachycardia  R00.0     Electronically signed by: Emil Travis M.D., 9/10/2020 9:31 PM

## 2020-09-11 NOTE — PROGRESS NOTES
2 RN skin check complete.   Devices in place n/a.  Skin assessed under devices n/a.  Confirmed pressure ulcers found on n/a.  New potential pressure ulcers noted on n/a. Wound consult placed n/a.  The following interventions in place: pt ambulatory, able to turn and reposition self.     Skin WDL, CDI.

## 2020-09-12 NOTE — PROGRESS NOTES
Report received from karyna Fernandez sleeping with his face mask in place.    Morning assessment done about 0830. Pt had several questions regarding labs and plan of care and spent over 15 minutes.  Asked pt about lifestyle and eating habits as cholesterol levels were very elevated; pt states he has been eating out and when he cooks at home its frozen pizzas and he chooses soda to drink.  When asking about follow up with pcp pt states he doesn't have one and no insurance.  Called echo and the plan is for after 1100.      APN rounded and spoke to him about going home on a statin for his cholesterol.    Echo completed about 1130.      Received orders for discharge and mother came to bedside about 1530.  Contact aprn about ordering statin for discharge.    Discharging Patient home per physician order.  Discharged with mother to home at 1650.  Demonstrated understanding of discharge instructions, follow up appointments, home medications, prescriptions sent to [facility], and nursing care instructions for cholesterol.  Ambulating without assistance, voiding without difficulty, pain well controlled, tolerating oral medications, oxygen saturation greater than 90% , tolerating diet.   Educational handouts given and discussed.  Verbalized understanding of discharge instructions and educational handouts.  All questions answered.  Belongings with patient at time of discharge. Pt was sent home with paperwork.  Spent over 45 minutes answering questions with his mother present.

## 2020-09-18 ENCOUNTER — OFFICE VISIT (OUTPATIENT)
Dept: MEDICAL GROUP | Facility: PHYSICIAN GROUP | Age: 30
End: 2020-09-18

## 2020-09-18 VITALS
OXYGEN SATURATION: 97 % | WEIGHT: 172.4 LBS | RESPIRATION RATE: 16 BRPM | HEART RATE: 84 BPM | HEIGHT: 69 IN | DIASTOLIC BLOOD PRESSURE: 82 MMHG | TEMPERATURE: 99 F | BODY MASS INDEX: 25.53 KG/M2 | SYSTOLIC BLOOD PRESSURE: 128 MMHG

## 2020-09-18 DIAGNOSIS — R00.2 PALPITATIONS: ICD-10-CM

## 2020-09-18 DIAGNOSIS — R07.89 ATYPICAL CHEST PAIN: ICD-10-CM

## 2020-09-18 PROBLEM — E87.6 HYPOKALEMIA: Status: RESOLVED | Noted: 2020-09-11 | Resolved: 2020-09-18

## 2020-09-18 PROCEDURE — 99214 OFFICE O/P EST MOD 30 MIN: CPT | Performed by: FAMILY MEDICINE

## 2020-09-18 ASSESSMENT — PATIENT HEALTH QUESTIONNAIRE - PHQ9: CLINICAL INTERPRETATION OF PHQ2 SCORE: 0

## 2020-09-18 ASSESSMENT — FIBROSIS 4 INDEX: FIB4 SCORE: 0.42

## 2020-09-18 NOTE — PROGRESS NOTES
"Subjective:     CC: hospital follow up  1. 9/10 - 9/11/2020: dizziness    HPI:   Rafael Ulysses presents today with hospital follow. He was admitted 9/10-9/11/2020 for dizziness and presyncope. He also had associated SOB.  He was noted to have tachycardia in the ER.  Work-up was negative and EKG showed sinus tachycardia.  Total cholesterol, triglycerides were elevated with a started on statin.  Echocardiogram was normal.  There were no bleeding episodes in the first time [facility] today recommend that he follow-up with cardiology for possible outpatient monitoring.    He reports not repeat episodes to that intensity since discharge. He wonders if the episode was from smoke and/or dehydration. He does get heart palpitations and this has been occasional, ongoing the problem.    History reviewed. No pertinent past medical history.    Social History     Tobacco Use   • Smoking status: Never Smoker   • Smokeless tobacco: Never Used   Substance Use Topics   • Alcohol use: Yes     Alcohol/week: 0.6 oz     Types: 1 Cans of beer per week     Frequency: Monthly or less     Comment: rarely   • Drug use: Never       Current Outpatient Medications Ordered in Epic   Medication Sig Dispense Refill   • simvastatin (ZOCOR) 20 MG Tab Take 1 Tab by mouth every evening. 30 Tab 11     No current [facility]-ordered facility-administered medications on file.        Allergies:  Patient has no known allergies.    Health Maintenance: Completed    ROS:  Gen: no fevers/chills  Pulm: no sob  CV: no chest pain  GI: no nausea/vomiting    Objective:     Exam:  /82 (BP Location: Left arm, Patient Position: Sitting, BP Cuff Size: Adult)   Pulse 84   Temp 37.2 °C (99 °F) (Temporal)   Resp 16   Ht 1.74 m (5' 8.5\")   Wt 78.2 kg (172 lb 6.4 oz)   SpO2 97%   BMI 25.83 kg/m²  Body mass index is 25.83 kg/m².    Gen: Alert and oriented, No apparent distress.  Neck: Neck is supple without lymphadenopathy.  Lungs: Normal effort, CTA " bilaterally, no wheezes, rhonchi, or rales  CV: Regular rate and rhythm. No murmurs, rubs, or gallops.  Ext: No clubbing, cyanosis, edema.    Assessment & Plan:     29 y.o. male with the following -     1. Atypical chest pain  2. Palpitations  This is an acute condition.  He was recently admitted to the hospital 9/10-9/1/2020 with an episode of atypical chest pain, shortness of breath, dizziness/lightheadedness, and presyncope.  Medical records were reviewed.  He states the episode only last for 30 seconds but was so severe he called 911.  In the ER he was noted to have sinus tachycardia and he stayed tachycardiac for some time.  He states since discharge he has had no repeat of that episode.  He does have a history of palpitations that occur occasionally since before this occurred.  He was started on a statin for elevated cholesterol and triglycerides.  After discussion, he would like to see a cardiologist to see if a longer-term monitoring is necessary.  - REFERRAL TO CARDIOLOGY General Cardiology PITER      Return if symptoms worsen or fail to improve.    Please note that this dictation was created using voice recognition software. I have made every reasonable attempt to correct obvious errors, but I expect that there are errors of grammar and possibly content that I did not discover before finalizing the note.

## 2020-09-25 ENCOUNTER — PATIENT MESSAGE (OUTPATIENT)
Dept: LAB | Facility: MEDICAL CENTER | Age: 30
End: 2020-09-25

## 2020-09-27 ENCOUNTER — HOSPITAL ENCOUNTER (EMERGENCY)
Facility: MEDICAL CENTER | Age: 30
End: 2020-09-27
Attending: EMERGENCY MEDICINE

## 2020-09-27 ENCOUNTER — APPOINTMENT (OUTPATIENT)
Dept: RADIOLOGY | Facility: MEDICAL CENTER | Age: 30
End: 2020-09-27
Attending: EMERGENCY MEDICINE

## 2020-09-27 VITALS
DIASTOLIC BLOOD PRESSURE: 64 MMHG | SYSTOLIC BLOOD PRESSURE: 126 MMHG | RESPIRATION RATE: 17 BRPM | HEART RATE: 98 BPM | BODY MASS INDEX: 26.52 KG/M2 | OXYGEN SATURATION: 95 % | WEIGHT: 175 LBS | HEIGHT: 68 IN | TEMPERATURE: 96.9 F

## 2020-09-27 DIAGNOSIS — R00.2 PALPITATIONS: ICD-10-CM

## 2020-09-27 DIAGNOSIS — R73.9 HYPERGLYCEMIA: ICD-10-CM

## 2020-09-27 LAB
ALBUMIN SERPL BCP-MCNC: 5.1 G/DL (ref 3.2–4.9)
ALBUMIN/GLOB SERPL: 1.8 G/DL
ALP SERPL-CCNC: 76 U/L (ref 30–99)
ALT SERPL-CCNC: 61 U/L (ref 2–50)
AMPHET UR QL SCN: NEGATIVE
ANION GAP SERPL CALC-SCNC: 16 MMOL/L (ref 7–16)
AST SERPL-CCNC: 27 U/L (ref 12–45)
BARBITURATES UR QL SCN: NEGATIVE
BASOPHILS # BLD AUTO: 0.9 % (ref 0–1.8)
BASOPHILS # BLD: 0.07 K/UL (ref 0–0.12)
BENZODIAZ UR QL SCN: NEGATIVE
BILIRUB SERPL-MCNC: 0.3 MG/DL (ref 0.1–1.5)
BUN SERPL-MCNC: 11 MG/DL (ref 8–22)
BZE UR QL SCN: NEGATIVE
CALCIUM SERPL-MCNC: 9.8 MG/DL (ref 8.5–10.5)
CANNABINOIDS UR QL SCN: NEGATIVE
CHLORIDE SERPL-SCNC: 97 MMOL/L (ref 96–112)
CO2 SERPL-SCNC: 22 MMOL/L (ref 20–33)
CREAT SERPL-MCNC: 0.68 MG/DL (ref 0.5–1.4)
D DIMER PPP IA.FEU-MCNC: 0.32 UG/ML (FEU) (ref 0–0.5)
EKG IMPRESSION: NORMAL
EOSINOPHIL # BLD AUTO: 0.06 K/UL (ref 0–0.51)
EOSINOPHIL NFR BLD: 0.8 % (ref 0–6.9)
ERYTHROCYTE [DISTWIDTH] IN BLOOD BY AUTOMATED COUNT: 41.4 FL (ref 35.9–50)
GLOBULIN SER CALC-MCNC: 2.8 G/DL (ref 1.9–3.5)
GLUCOSE SERPL-MCNC: 220 MG/DL (ref 65–99)
HCT VFR BLD AUTO: 47.9 % (ref 42–52)
HGB BLD-MCNC: 16.4 G/DL (ref 14–18)
IMM GRANULOCYTES # BLD AUTO: 0.09 K/UL (ref 0–0.11)
IMM GRANULOCYTES NFR BLD AUTO: 1.2 % (ref 0–0.9)
LYMPHOCYTES # BLD AUTO: 1.25 K/UL (ref 1–4.8)
LYMPHOCYTES NFR BLD: 16.8 % (ref 22–41)
MCH RBC QN AUTO: 30.4 PG (ref 27–33)
MCHC RBC AUTO-ENTMCNC: 34.2 G/DL (ref 33.7–35.3)
MCV RBC AUTO: 88.9 FL (ref 81.4–97.8)
METHADONE UR QL SCN: NEGATIVE
MONOCYTES # BLD AUTO: 0.38 K/UL (ref 0–0.85)
MONOCYTES NFR BLD AUTO: 5.1 % (ref 0–13.4)
NEUTROPHILS # BLD AUTO: 5.58 K/UL (ref 1.82–7.42)
NEUTROPHILS NFR BLD: 75.2 % (ref 44–72)
NRBC # BLD AUTO: 0 K/UL
NRBC BLD-RTO: 0 /100 WBC
OPIATES UR QL SCN: NEGATIVE
OXYCODONE UR QL SCN: NEGATIVE
PCP UR QL SCN: NEGATIVE
PLATELET # BLD AUTO: 265 K/UL (ref 164–446)
PMV BLD AUTO: 10.4 FL (ref 9–12.9)
POTASSIUM SERPL-SCNC: 3.8 MMOL/L (ref 3.6–5.5)
PROPOXYPH UR QL SCN: NEGATIVE
PROT SERPL-MCNC: 7.9 G/DL (ref 6–8.2)
RBC # BLD AUTO: 5.39 M/UL (ref 4.7–6.1)
SODIUM SERPL-SCNC: 135 MMOL/L (ref 135–145)
TROPONIN T SERPL-MCNC: 6 NG/L (ref 6–19)
TSH SERPL DL<=0.005 MIU/L-ACNC: 1.31 UIU/ML (ref 0.38–5.33)
WBC # BLD AUTO: 7.4 K/UL (ref 4.8–10.8)

## 2020-09-27 PROCEDURE — 36415 COLL VENOUS BLD VENIPUNCTURE: CPT

## 2020-09-27 PROCEDURE — 85025 COMPLETE CBC W/AUTO DIFF WBC: CPT

## 2020-09-27 PROCEDURE — 80053 COMPREHEN METABOLIC PANEL: CPT

## 2020-09-27 PROCEDURE — 96374 THER/PROPH/DIAG INJ IV PUSH: CPT

## 2020-09-27 PROCEDURE — 93005 ELECTROCARDIOGRAM TRACING: CPT | Performed by: EMERGENCY MEDICINE

## 2020-09-27 PROCEDURE — 84484 ASSAY OF TROPONIN QUANT: CPT

## 2020-09-27 PROCEDURE — 85379 FIBRIN DEGRADATION QUANT: CPT

## 2020-09-27 PROCEDURE — 71045 X-RAY EXAM CHEST 1 VIEW: CPT

## 2020-09-27 PROCEDURE — 99284 EMERGENCY DEPT VISIT MOD MDM: CPT

## 2020-09-27 PROCEDURE — 700101 HCHG RX REV CODE 250: Performed by: EMERGENCY MEDICINE

## 2020-09-27 PROCEDURE — 700105 HCHG RX REV CODE 258: Performed by: EMERGENCY MEDICINE

## 2020-09-27 PROCEDURE — 93005 ELECTROCARDIOGRAM TRACING: CPT

## 2020-09-27 PROCEDURE — 96375 TX/PRO/DX INJ NEW DRUG ADDON: CPT

## 2020-09-27 PROCEDURE — 84443 ASSAY THYROID STIM HORMONE: CPT

## 2020-09-27 PROCEDURE — 700111 HCHG RX REV CODE 636 W/ 250 OVERRIDE (IP): Performed by: EMERGENCY MEDICINE

## 2020-09-27 PROCEDURE — 80307 DRUG TEST PRSMV CHEM ANLYZR: CPT

## 2020-09-27 RX ORDER — LABETALOL HYDROCHLORIDE 5 MG/ML
10 INJECTION, SOLUTION INTRAVENOUS ONCE
Status: COMPLETED | OUTPATIENT
Start: 2020-09-27 | End: 2020-09-27

## 2020-09-27 RX ORDER — LORAZEPAM 2 MG/ML
1 INJECTION INTRAMUSCULAR ONCE
Status: COMPLETED | OUTPATIENT
Start: 2020-09-27 | End: 2020-09-27

## 2020-09-27 RX ORDER — ATENOLOL 25 MG/1
25 TABLET ORAL DAILY
Qty: 30 TAB | Refills: 1 | Status: SHIPPED | OUTPATIENT
Start: 2020-09-27 | End: 2020-11-25 | Stop reason: SDUPTHER

## 2020-09-27 RX ORDER — SODIUM CHLORIDE 9 MG/ML
1000 INJECTION, SOLUTION INTRAVENOUS ONCE
Status: COMPLETED | OUTPATIENT
Start: 2020-09-27 | End: 2020-09-27

## 2020-09-27 RX ADMIN — LABETALOL HYDROCHLORIDE 10 MG: 5 INJECTION, SOLUTION INTRAVENOUS at 18:28

## 2020-09-27 RX ADMIN — SODIUM CHLORIDE 1000 ML: 9 INJECTION, SOLUTION INTRAVENOUS at 17:30

## 2020-09-27 RX ADMIN — LORAZEPAM 1 MG: 2 INJECTION INTRAMUSCULAR; INTRAVENOUS at 17:31

## 2020-09-27 ASSESSMENT — FIBROSIS 4 INDEX: FIB4 SCORE: 0.42

## 2020-09-27 NOTE — ED PROVIDER NOTES
"ED Provider Note    CHIEF COMPLAINT  Chief Complaint   Patient presents with   • Tachycardia     pt arrives via flight with c/o tachycardia. pt states sudden onset approx 40 minutes PTA. states he was sitting at home working, denies exertion, states felt increasing over few minutes, describes as feeling \"pounding\" out of chest. patient states he did consume 1 energy drink today. recently admitted to [facility] for similar episode, dx with hyperlipidemia and placed on simvastatin. pt arrives AAOx4.        HPI  Rafael Ulysses Zaragoza is a 29 y.o. male who presents for evaluation of palpitations tachycardia.  The patient apparently was recently admitted at this facility for similar symptoms.  He reports feeling anxious and having an elevated heart rate potentially after drinking an energy drink this afternoon.  He denies any focal numbness weakness tingling to the arms legs or face.  He was recently diagnosed with high cholesterol start a medication for that.  He denies cocaine or illicit drug use.  No high fevers or chills.  No reported COVID-19 symptoms such as runny nose cough congestion.  No pleuritic chest pain or hemoptysis no leg swelling, no known history of thyroid disease    REVIEW OF SYSTEMS  See HPI for further details.  No loss of consciousness numbness tingling weakness all other systems are negative.     PAST MEDICAL HISTORY  Past Medical History:   Diagnosis Date   • Hyperlipidemia        FAMILY HISTORY  No history of sudden cardiac death    SOCIAL HISTORY  Social History     Socioeconomic History   • Marital status: Single     Spouse name: Not on file   • Number of children: Not on file   • Years of education: Not on file   • Highest education level: Not on file   Occupational History   • Not on file   Social Needs   • Financial resource strain: Not on file   • Food insecurity     Worry: Never true     Inability: Never true   • Transportation needs     Medical: No     Non-medical: No   Tobacco Use   • " "Smoking status: Never Smoker   • Smokeless tobacco: Never Used   Substance and Sexual Activity   • Alcohol use: Yes     Alcohol/week: 0.6 oz     Types: 1 Cans of beer per week     Frequency: Monthly or less     Comment: rarely   • Drug use: Never   • Sexual activity: Not Currently   Lifestyle   • Physical activity     Days per week: Not on file     Minutes per session: Not on file   • Stress: Not on file   Relationships   • Social connections     Talks on phone: Not on file     Gets together: Not on file     Attends Jain service: Not on file     Active member of club or organization: Not on file     Attends meetings of clubs or organizations: Not on file     Relationship status: Not on file   • Intimate partner violence     Fear of current or ex partner: Not on file     Emotionally abused: Not on file     Physically abused: Not on file     Forced sexual activity: Not on file   Other Topics Concern   • Not on file   Social History Narrative   • Not on file     Denies illicit drug use  SURGICAL HISTORY  Past Surgical History:   Procedure Laterality Date   • DENTAL EXTRACTION(S)  2004       CURRENT MEDICATIONS  Home Medications     Reviewed by Lucie Parr R.N. (Registered Nurse) on 09/27/20 at 1631  Med List Status: Not Addressed   Medication Last Dose Status   simvastatin (ZOCOR) 20 MG Tab  Active                ALLERGIES  No Known Allergies    PHYSICAL EXAM  VITAL SIGNS: /75   Pulse (!) 107   Temp 36.1 °C (96.9 °F) (Oral)   Resp 19   Ht 1.727 m (5' 8\")   Wt 79.4 kg (175 lb)   SpO2 93%   BMI 26.61 kg/m²       Constitutional: Well developed, Well nourished, No acute distress, Non-toxic appearance.   HENT: Normocephalic, Atraumatic, Bilateral external ears normal, dry mucous membranes, No oral exudates, Nose normal.   Eyes: PERRLA, EOMI, Conjunctiva normal, No discharge.   Neck: Normal range of motion, No tenderness, Supple, No stridor.   Lymphatic: No lymphadenopathy noted.   Cardiovascular: " Normal heart rate, Normal rhythm, No murmurs, No rubs, No gallops.   Thorax & Lungs: Normal breath sounds, No respiratory distress, No wheezing, No chest tenderness.   Abdomen: Bowel sounds normal, Soft, No tenderness, No masses, No pulsatile masses.   Skin: Warm, Dry, No erythema, No rash.   Back: No tenderness, No CVA tenderness.   Extremities: Intact distal pulses, No edema, No tenderness, No cyanosis, No clubbing.   Neurologic: Alert & oriented x 3, Normal motor function, Normal sensory function, No focal deficits noted.   Psychiatric: Anxious.   Results for orders placed or performed during the hospital encounter of 09/27/20   TSH   Result Value Ref Range    TSH 1.310 0.380 - 5.330 uIU/mL   URINE DRUG SCREEN   Result Value Ref Range    Amphetamines Urine Negative Negative    Barbiturates Negative Negative    Benzodiazepines Negative Negative    Cocaine Metabolite Negative Negative    Methadone Negative Negative    Opiates Negative Negative    Oxycodone Negative Negative    Phencyclidine -Pcp Negative Negative    Propoxyphene Negative Negative    Cannabinoid Metab Negative Negative   CBC WITH DIFFERENTIAL   Result Value Ref Range    WBC 7.4 4.8 - 10.8 K/uL    RBC 5.39 4.70 - 6.10 M/uL    Hemoglobin 16.4 14.0 - 18.0 g/dL    Hematocrit 47.9 42.0 - 52.0 %    MCV 88.9 81.4 - 97.8 fL    MCH 30.4 27.0 - 33.0 pg    MCHC 34.2 33.7 - 35.3 g/dL    RDW 41.4 35.9 - 50.0 fL    Platelet Count 265 164 - 446 K/uL    MPV 10.4 9.0 - 12.9 fL    Neutrophils-Polys 75.20 (H) 44.00 - 72.00 %    Lymphocytes 16.80 (L) 22.00 - 41.00 %    Monocytes 5.10 0.00 - 13.40 %    Eosinophils 0.80 0.00 - 6.90 %    Basophils 0.90 0.00 - 1.80 %    Immature Granulocytes 1.20 (H) 0.00 - 0.90 %    Nucleated RBC 0.00 /100 WBC    Neutrophils (Absolute) 5.58 1.82 - 7.42 K/uL    Lymphs (Absolute) 1.25 1.00 - 4.80 K/uL    Monos (Absolute) 0.38 0.00 - 0.85 K/uL    Eos (Absolute) 0.06 0.00 - 0.51 K/uL    Baso (Absolute) 0.07 0.00 - 0.12 K/uL    Immature  Granulocytes (abs) 0.09 0.00 - 0.11 K/uL    NRBC (Absolute) 0.00 K/uL   Comp Metabolic Panel   Result Value Ref Range    Sodium 135 135 - 145 mmol/L    Potassium 3.8 3.6 - 5.5 mmol/L    Chloride 97 96 - 112 mmol/L    Co2 22 20 - 33 mmol/L    Anion Gap 16.0 7.0 - 16.0    Glucose 220 (H) 65 - 99 mg/dL    Bun 11 8 - 22 mg/dL    Creatinine 0.68 0.50 - 1.40 mg/dL    Calcium 9.8 8.5 - 10.5 mg/dL    AST(SGOT) 27 12 - 45 U/L    ALT(SGPT) 61 (H) 2 - 50 U/L    Alkaline Phosphatase 76 30 - 99 U/L    Total Bilirubin 0.3 0.1 - 1.5 mg/dL    Albumin 5.1 (H) 3.2 - 4.9 g/dL    Total Protein 7.9 6.0 - 8.2 g/dL    Globulin 2.8 1.9 - 3.5 g/dL    A-G Ratio 1.8 g/dL   TROPONIN   Result Value Ref Range    Troponin T 6 6 - 19 ng/L   D-DIMER   Result Value Ref Range    D-Dimer Screen 0.32 0.00 - 0.50 ug/mL (FEU)   ESTIMATED GFR   Result Value Ref Range    GFR If African American >60 >60 mL/min/1.73 m 2    GFR If Non African American >60 >60 mL/min/1.73 m 2   EKG (NOW)   Result Value Ref Range    Report       [facility] Emergency Dept.    Test Date:  2020  Pt Name:    RAFAEL ULYSSES ZARAGOZA      Department: ER  MRN:        [id number]                      Room:       RD 09  Gender:     Male                         Technician: [id number]  :        1990                   Requested By:ER TRIAGE PROTOCOL  Order #:    [id number]                    Reading MD:    Measurements  Intervals                                Axis  Rate:       117                          P:          38  AL:         196                          QRS:        58  QRSD:       94                           T:          -25  QT:         296  QTc:        413    Interpretive Statements  SINUS TACHYCARDIA  BORDERLINE AV CONDUCTION DELAY  BORDERLINE T ABNORMALITIES, INFERIOR LEADS  Compared to ECG 09/10/2020 20:46:50  T-wave abnormality now present  Early repolarization no longer present        EKG interpretation by me rate 117 sinus tachycardia nonspecific T  wave changes in the inferior leads no classic ST segment elevation or depression no pathological T wave inversions.  No delta wave intervals and axis are normal no suggestion of WPW, Brugada syndrome    COURSE & MEDICAL DECISION MAKING  Pertinent Labs & Imaging studies reviewed. (See chart for details)  An IV was established.  Reviewed the patient's records and his recent admission 2 weeks ago.  He had an echocardiogram and extensive work-up.  The plan was outpatient referral to cardiology with possible Holter monitor.  The patient presents here with sinus tachycardia.  He is slightly hypertensive as well.  Blood sugar is slightly elevated but not critically elevated.  He has no acidosis.  Urine toxicology is negative thyroid studies and d-dimer testing is negative as well.  He had already had an echocardiogram recently demonstrated no acute process.  I gave him a dose of labetalol and brought both his heart rate and blood pressure down.  We will refer him back to cardiology start him on atenolol and he has follow-up with his PCP for management of what apparently is diet-controlled diabetes.  They are currently going to try diet and exercise for months    FINAL IMPRESSION  1.  Palpitations  2.  Hyperglycemia, mild         Electronically signed by: Luke Arredondo M.D., 9/27/2020 4:51 PM

## 2020-09-27 NOTE — ED TRIAGE NOTES
"Chief Complaint   Patient presents with   • Tachycardia     pt arrives via flight with c/o tachycardia. pt states sudden onset approx 40 minutes PTA. states he was sitting at home working, denies exertion, states felt increasing over few minutes, describes as feeling \"pounding\" out of chest. patient states he did consume 1 energy drink today. recently admitted to [facility] for similar episode, dx with hyperlipidemia and placed on simvastatin. pt arrives AAOx4.        "

## 2020-09-28 NOTE — ED NOTES
Pt discharged home. Explained discharge and medication instructions. Questions and comments addressed. Pt verbalized understanding of instructions. Pt advised to follow-up with PCP in 1 day or return to ED for any new or worsening of symptoms. Pt is ambulating well and steady on feet. VS stable. Pt's mother at bedside and will be driving pt home.

## 2020-09-30 ENCOUNTER — OFFICE VISIT (OUTPATIENT)
Dept: MEDICAL GROUP | Facility: PHYSICIAN GROUP | Age: 30
End: 2020-09-30

## 2020-09-30 ENCOUNTER — TELEPHONE (OUTPATIENT)
Dept: MEDICAL GROUP | Facility: PHYSICIAN GROUP | Age: 30
End: 2020-09-30

## 2020-09-30 VITALS
SYSTOLIC BLOOD PRESSURE: 128 MMHG | TEMPERATURE: 99.1 F | WEIGHT: 172 LBS | OXYGEN SATURATION: 97 % | RESPIRATION RATE: 16 BRPM | BODY MASS INDEX: 25.48 KG/M2 | HEART RATE: 117 BPM | HEIGHT: 69 IN | DIASTOLIC BLOOD PRESSURE: 80 MMHG

## 2020-09-30 DIAGNOSIS — R73.09 ELEVATED GLUCOSE: ICD-10-CM

## 2020-09-30 DIAGNOSIS — R00.0 SINUS TACHYCARDIA: ICD-10-CM

## 2020-09-30 PROCEDURE — 99213 OFFICE O/P EST LOW 20 MIN: CPT | Performed by: FAMILY MEDICINE

## 2020-09-30 ASSESSMENT — FIBROSIS 4 INDEX: FIB4 SCORE: 0.4

## 2020-09-30 NOTE — LETTER
September 30, 2020         Patient: Rafael Ulysses Zaragoza   YOB: 1990   Date of Visit: 9/30/2020           To Whom it May Concern:    Rafael Ulysses Zaragoza was seen in my clinic on 9/30/2020.     If you have any questions or concerns, please don't hesitate to call.        Sincerely,           Roseanna Welsh M.D.  Electronically Signed

## 2020-09-30 NOTE — TELEPHONE ENCOUNTER
Spoke with patient and let them know Roseanna Welsh M.D.'s message.  Patient is still requesting glucose testing ... states he will pay cash for this if needed.  He is requesting fasting blood sugar test.

## 2020-09-30 NOTE — TELEPHONE ENCOUNTER
He had a hemoglobin A1 checked while in the ER/hospital on 9/11/2020. This indicates pre-diabetes. The soonest we could check that test again is 12/11/2020 as that number gives a 3 month picture of sugars. Since it is pre-diabetes, I generally only check it once a year unless he wants to check in 3 months.

## 2020-09-30 NOTE — PROGRESS NOTES
"Subjective:     CC: ER follow up  1. 9/27/2020: sinus tachycardia    HPI:   Rafael Ulysses presents today with ER follow up. He was seen in the ER on 9/27/2020 with sinus tachycardia. He had drunk an energy drink beforehand. He measured his blood sugar, which was 160. He states he had eaten recently. He had associated SOB and felt like he was going to pass out. Work up was negative and they started him on atenolol. He does have follow up with cardiology on 10/5/2020. He hasn't been taking the atenolol yet.     Past Medical History:   Diagnosis Date   • Hyperlipidemia        Social History     Tobacco Use   • Smoking status: Never Smoker   • Smokeless tobacco: Never Used   Substance Use Topics   • Alcohol use: Yes     Alcohol/week: 0.6 oz     Types: 1 Cans of beer per week     Frequency: Monthly or less     Comment: rarely   • Drug use: Never       Current Outpatient Medications Ordered in Epic   Medication Sig Dispense Refill   • atenolol (TENORMIN) 25 MG Tab Take 1 Tab by mouth every day. 30 Tab 1   • simvastatin (ZOCOR) 20 MG Tab Take 1 Tab by mouth every evening. 30 Tab 11     No current [facility]-ordered facility-administered medications on file.        Allergies:  Patient has no known allergies.    ROS:  Gen: no fevers/chills  Pulm: no sob  CV: no chest pain    Objective:     Exam:  /80 (BP Location: Left arm, Patient Position: Sitting, BP Cuff Size: Adult)   Pulse (!) 117   Temp 37.3 °C (99.1 °F)   Resp 16   Ht 1.74 m (5' 8.5\")   Wt 78 kg (172 lb)   SpO2 97%   BMI 25.77 kg/m²  Body mass index is 25.77 kg/m².    Gen: Alert and oriented, No apparent distress.  Neck: Neck is supple without lymphadenopathy.  Lungs: Normal effort, CTA bilaterally, no wheezes, rhonchi, or rales  CV: Tachycardic rate and regular rhythm. No murmurs, rubs, or gallops.  Ext: No clubbing, cyanosis, edema.    Assessment & Plan:     29 y.o. male with the following -     1. Sinus tachycardia  This is an acute condition.  He was " admitted 9/10-9/11/2020 for sinus tachycardia.  He underwent a fairly extensive work-up including negative troponin, negative d-dimer, negative EKG except for sinus tachycardia, telemetry, cholesterol panel, normal thyroid panel, and normal echocardiogram.  He was discharged home and ended up back in the ER again on 9/27/2020 with sinus tachycardia again.  He states he had drunk an energy drink prior to the onset of the episode.  He states it is continued get worse he had some associate shortness of breath and presyncopal symptoms.  In the ER, again noted to have sinus tachycardia with a normal work-up including normal troponin and d-dimer.  He has an appoint with cardiology next week to follow-up on this.  He was prescribed atenolol but he has not started it yet as he wanted to discuss it further with me.  He does have sinus tachycardia during our visit today and during my exams are recommended he start taking the atenolol.  He had a lot of questions for me today that I could not answer to either his or my satisfaction because we still do not know quite what is causing his sinus tachycardia.  Once we can determine that then we can help him potentially treat it and answer more of his questions.  I strongly encouraged him to discuss these questions further with cardiology as they likely will be able to answer them better than I can.    Return if symptoms worsen or fail to improve.    Please note that this dictation was created using voice recognition software. I have made every reasonable attempt to correct obvious errors, but I expect that there are errors of grammar and possibly content that I did not discover before finalizing the note.

## 2020-09-30 NOTE — TELEPHONE ENCOUNTER
Phone Number Called: [phone] (home)     Call outcome: Spoke to patient regarding message below.    Message: Spoke with patient and let them know Roseanna Welsh M.D.'s message.

## 2020-10-01 ENCOUNTER — OFFICE VISIT (OUTPATIENT)
Dept: CARDIOLOGY | Facility: MEDICAL CENTER | Age: 30
End: 2020-10-01

## 2020-10-01 VITALS
RESPIRATION RATE: 16 BRPM | BODY MASS INDEX: 25.48 KG/M2 | HEIGHT: 69 IN | DIASTOLIC BLOOD PRESSURE: 80 MMHG | SYSTOLIC BLOOD PRESSURE: 126 MMHG | WEIGHT: 172 LBS | HEART RATE: 96 BPM | OXYGEN SATURATION: 96 %

## 2020-10-01 DIAGNOSIS — E78.49 FAMILIAL HYPERLIPIDEMIA: ICD-10-CM

## 2020-10-01 DIAGNOSIS — R00.0 TACHYCARDIA: ICD-10-CM

## 2020-10-01 DIAGNOSIS — R00.2 PALPITATIONS: ICD-10-CM

## 2020-10-01 PROBLEM — R01.1 MURMUR: Status: RESOLVED | Noted: 2018-11-09 | Resolved: 2020-10-01

## 2020-10-01 PROBLEM — R07.89 ATYPICAL CHEST PAIN: Status: RESOLVED | Noted: 2020-09-11 | Resolved: 2020-10-01

## 2020-10-01 LAB — EKG IMPRESSION: NORMAL

## 2020-10-01 PROCEDURE — 93000 ELECTROCARDIOGRAM COMPLETE: CPT | Performed by: INTERNAL MEDICINE

## 2020-10-01 PROCEDURE — 99204 OFFICE O/P NEW MOD 45 MIN: CPT | Performed by: INTERNAL MEDICINE

## 2020-10-01 ASSESSMENT — ENCOUNTER SYMPTOMS
IRREGULAR HEARTBEAT: 0
DIARRHEA: 0
BLURRED VISION: 0
NEAR-SYNCOPE: 0
DEPRESSION: 0
PALPITATIONS: 1
BACK PAIN: 0
SHORTNESS OF BREATH: 0
ALTERED MENTAL STATUS: 0
PND: 0
CLAUDICATION: 0
FEVER: 0
HEARTBURN: 0
DECREASED APPETITE: 0
ORTHOPNEA: 0
WEIGHT GAIN: 0
WEIGHT LOSS: 0
CONSTIPATION: 0
VOMITING: 0
SYNCOPE: 0
DIZZINESS: 0
FLANK PAIN: 0
NAUSEA: 0
ABDOMINAL PAIN: 0
DYSPNEA ON EXERTION: 0
COUGH: 0

## 2020-10-01 ASSESSMENT — FIBROSIS 4 INDEX: FIB4 SCORE: 0.4

## 2020-10-01 NOTE — PATIENT INSTRUCTIONS
[facility], [place] - make appointment for genetic testing    Familial Hypercholesterolemia  Familial hypercholesterolemia (FH) is a genetic disorder that causes a very high level of LDL (low-density lipoprotein) cholesterol. Cholesterol is a waxy, fat-like substance that your body needs to build cells. Your body makes all the cholesterol it needs in the liver and removes extra (excess) cholesterol from the blood as needed. Excess cholesterol comes from food that you eat. In people who have FH, the body is not able to remove LDL cholesterol from the blood as it should.  A high level of LDL cholesterol puts you at higher risk for narrowing and hardening of your arteries (atherosclerosis) at an early age. This raises your risk for heart disease and stroke.  What are the causes?  FH is passed from parent to child (inherited). FH is caused by an inherited gene defect (genetic mutation) that makes it hard for the liver to remove LDL cholesterol from the blood. The gene may be inherited from one parent or both parents.  What increases the risk?  You may be at higher risk for FH if:  · You have a family history of the condition. If both parents carry the genetic mutation, their children are at higher risk for a more severe form of FH, with symptoms that start at an earlier age.  What are the signs or symptoms?  You may have a high level of LDL cholesterol before you develop symptoms. Symptoms of FH may include:  · Cholesterol nodules (xanthomas) on the cords of tissue that connect muscles to bones (tendons). Xanthomas often form on the long tendon at the back of the ankle (Achilles tendon) or on the tendons on the back of the hands.  · Cholesterol deposits (xanthelasmas) under the skin of the eyelids.  · A gray or blue ring around the white part of the eye (corneal arcus).  Complications of FH can occur due to atherosclerosis. Atherosclerosis may cause damage to an area of the body that is not getting enough blood.  Complications of FH may include:  · Chest pain (angina) and shortness of breath due to narrowed or blocked arteries in the heart (coronary artery disease).  · Pain and cramping in the back of the lower legs (calves) when walking (claudication).  · Interruption in blood flow to the brain (stroke). This may cause:  ? Loss of balance.  ? Vision loss.  ? Sudden weakness or numbness on one side of the body.  How is this diagnosed?  This condition may be diagnosed based on:  · Your symptoms.  · Your medical history, including any family history of FH or early coronary heart disease.  · A physical exam.  · A blood test to check for the genetic mutation that causes FH. Your family members may also be tested.  How is this treated?  There is no cure for FH, but treatment can lower LDL cholesterol levels and lower your risk for heart attack or stroke. Treatment should be started as soon as you are diagnosed. Treatment may include:  · A type of medicine that lowers your cholesterol (statin). If statins do not help, your health care provider may try other kinds of cholesterol-lowering medicines. The exact combination of medicines depends on the severity of your symptoms.  · A procedure to filter LDL from your blood (apheresis). You may need this treatment if you have a severe form of FH.  · Making lifestyle changes that are healthy for your heart, such as lowering the amount of fat and cholesterol in your diet.  Follow these instructions at home:  Lifestyle    · Lose weight, if directed by your health care provider.  · Follow instructions from your health care provider about eating a healthy diet. Your health care provider may recommend:  ? Working with a diet and nutrition specialist (dietitian), who can help you make a healthy eating plan and help you maintain a healthy weight.  ? Eating less fat and cholesterol. Avoid fatty meats, fried foods, and whole-fat dairy.  ? Eating more vegetables, fruits, and whole  "grains.  ? Limiting your intake of alcohol.  · Be physically active. Ask your health care provider what type of exercise is best for you.  · Do not use any products that contain nicotine or tobacco, such as cigarettes and e-cigarettes. If you need help quitting, ask your health care provider.  · Work with your health care provider to manage any other conditions you have, such as high blood pressure (hypertension) or diabetes. These conditions affect your heart.  General instructions  · Take over-the-counter and prescription medicines only as told by your health care provider.  · Keep all follow-up visits as told by your health care provider. This is important.  Contact a health care provider if:  · You have pain or cramps in your calf when you walk.  Get help right away if:    · You have sudden, unexplained discomfort in your chest, arms, back, neck, jaw, or upper body.  · You have trouble breathing.  · You have a sudden, severe headache with no known cause.  · You have any symptoms of a stroke. \"BE FAST\" is an easy way to remember the main warning signs of a stroke:  ? B - Balance. Signs are dizziness, sudden trouble walking, or loss of balance.  ? E - Eyes. Signs are trouble seeing or a sudden change in vision.  ? F - Face. Signs are sudden weakness or numbness of the face, or the face or eyelid drooping on one side.  ? A - Arms. Signs are weakness or numbness in an arm. This happens suddenly and usually on one side of the body.  ? S - Speech. Signs are sudden trouble speaking, slurred speech, or trouble understanding what people say.  ? T - Time. Time to call emergency services. Write down what time symptoms started.  These symptoms may represent a serious problem that is an emergency. Do not wait to see if the symptoms will go away. Get medical help right away. Call your local emergency services (911 in the [place] Do not drive yourself to the hospital.  Summary  · Familial hypercholesterolemia (FH) is a genetic " disorder that causes a very high level of LDL (low-density lipoprotein) cholesterol.  · FH increases your risk for coronary heart disease and stroke at an early age.  · Treatment for FH should be started as soon as you are diagnosed with the condition. Treatment is aimed at lowering your risk for complications.  · Follow instructions from your health care provider about eating a healthy diet. Your health care provider may recommend eating less fat and cholesterol.  This information is not intended to replace advice given to you by your health care provider. Make sure you discuss any questions you have with your health care provider.  Document Released: 05/31/2018 Document Revised: 11/30/2018 Document Reviewed: 05/31/2018  [employer] Patient Education © 2020 [employer]      Sinus Tachycardia    Sinus tachycardia is a kind of fast heartbeat. In sinus tachycardia, the heart beats more than 100 times a minute. Sinus tachycardia starts in a part of the heart called the sinus node. Sinus tachycardia may be harmless, or it may be a sign of a serious condition.  What are the causes?  This condition may be caused by:  · Exercise or exertion.  · A fever.  · Pain.  · Loss of body fluids (dehydration).  · Severe bleeding (hemorrhage).  · Anxiety and stress.  · Certain substances, including:  ? Alcohol.  ? Caffeine.  ? Tobacco and nicotine products.  ? Cold medicines.  ? Illegal drugs.  · Medical conditions including:  ? Heart disease.  ? An infection.  ? An overactive thyroid (hyperthyroidism).  ? A lack of red blood cells (anemia).  What are the signs or symptoms?  Symptoms of this condition include:  · A feeling that the heart is beating quickly (palpitations).  · Suddenly noticing your heartbeat (cardiac awareness).  · Dizziness.  · Tiredness (fatigue).  · Shortness of breath.  · Chest pain.  · Nausea.  · Fainting.  How is this diagnosed?  This condition is diagnosed with:  · A physical exam.  · Other tests, such  as:  ? Blood tests.  ? An electrocardiogram (ECG). This test measures the electrical activity of the heart.  ? Ambulatory cardiac monitor. This records your heartbeats for 24 hours or more.  You may be referred to a heart specialist (cardiologist).  How is this treated?  Treatment for this condition depends on the cause or the underlying condition. Treatment may involve:  · Treating the underlying condition.  · Taking new medicines or changing your current medicines as told by your health care provider.  · Making changes to your diet or lifestyle.  Follow these instructions at home:  Lifestyle    · Do not use any products that contain nicotine or tobacco, such as cigarettes and e-cigarettes. If you need help quitting, ask your health care provider.  · Do not use illegal drugs, such as cocaine.  · Learn relaxation methods to help you when you get stressed or anxious. These include deep breathing.  · Avoid caffeine or other stimulants.  Alcohol use    · Do not drink alcohol if:  ? Your health care provider tells you not to drink.  ? You are pregnant, may be pregnant, or are planning to become pregnant.  · If you drink alcohol, limit how much you have:  ? 0-1 drink a day for women.  ? 0-2 drinks a day for men.  · Be aware of how much alcohol is in your drink. In the [place] one drink equals one typical bottle of beer (12 oz), one-half glass of wine (5 oz), or one shot of hard liquor (1½ oz).  General instructions  · Drink enough fluids to keep your urine pale yellow.  · Take over-the-counter and prescription medicines only as told by your health care provider.  · Keep all follow-up visits as told by your health care provider. This is important.  Contact a health care provider if you have:  · A fever.  · Vomiting or diarrhea that does not go away.  Get help right away if you:  · Have pain in your chest, upper arms, jaw, or neck.  · Become weak or dizzy.  · Feel faint.  · Have palpitations that do not go  away.  Summary  · In sinus tachycardia, the heart beats more than 100 times a minute.  · Sinus tachycardia may be harmless, or it may be a sign of a serious condition.  · Treatment for this condition depends on the cause or the underlying condition.  · Get help right away if you have pain in your chest, upper arms, jaw, or neck.  This information is not intended to replace advice given to you by your health care provider. Make sure you discuss any questions you have with your health care provider.  Document Released: 01/25/2006 Document Revised: 02/06/2019 Document Reviewed: 02/06/2019  Elsevier Patient Education © 2020 [employer]

## 2020-10-01 NOTE — PROGRESS NOTES
Cardiology Note    Chief Complaint   Patient presents with   • Palpitations       History of Present Illness: Rafael Ulysses Zaragoza is a 29 y.o. male PMH prediabetes who presents for palpitations.    Convoluted story of walking for shorter distance than usual after which felt funny. Breathing was thrown off. Felt palpitations/tachycardia. Called EMS. Was concerned because heart rate persisted elevated. Two occurrences early September. On both cardiac workup normal at ED. Today without ongoing symptoms, however, he is worried about this tachycardia. Lasts <1 h at a time. Has only happened twice.     Review of Systems   Constitution: Negative for decreased appetite, fever, malaise/fatigue, weight gain and weight loss.   HENT: Negative for congestion and nosebleeds.    Eyes: Negative for blurred vision.   Cardiovascular: Positive for palpitations. Negative for chest pain, claudication, dyspnea on exertion, irregular heartbeat, leg swelling, near-syncope, orthopnea, paroxysmal nocturnal dyspnea and syncope.   Respiratory: Negative for cough and shortness of breath.    Endocrine: Negative for cold intolerance and heat intolerance.   Skin: Negative for rash.   Musculoskeletal: Negative for back pain.   Gastrointestinal: Negative for abdominal pain, constipation, diarrhea, heartburn, melena, nausea and vomiting.   Genitourinary: Negative for dysuria, flank pain and hematuria.   Neurological: Negative for dizziness.   Psychiatric/Behavioral: Negative for altered mental status and depression.         Past Medical History:   Diagnosis Date   • Hyperlipidemia          Past Surgical History:   Procedure Laterality Date   • DENTAL EXTRACTION(S)  2004         Current Outpatient Medications   Medication Sig Dispense Refill   • atenolol (TENORMIN) 25 MG Tab Take 1 Tab by mouth every day. 30 Tab 1   • simvastatin (ZOCOR) 20 MG Tab Take 1 Tab by mouth every evening. 30 Tab 11     No current facility-administered medications for  "this visit.          No Known Allergies      Family History   Problem Relation Age of Onset   • Diabetes Mother    • Diabetes Father    • Heart Disease Father    • No Known Problems Brother    • Cancer Maternal Grandmother         pancreatic   • Stroke Paternal Grandfather          Social History     Socioeconomic History   • Marital status: Single     Spouse name: Not on file   • Number of children: Not on file   • Years of education: Not on file   • Highest education level: Not on file   Occupational History   • Not on file   Social Needs   • Financial resource strain: Not on file   • Food insecurity     Worry: Never true     Inability: Never true   • Transportation needs     Medical: No     Non-medical: No   Tobacco Use   • Smoking status: Never Smoker   • Smokeless tobacco: Never Used   Substance and Sexual Activity   • Alcohol use: Yes     Alcohol/week: 0.6 oz     Types: 1 Cans of beer per week     Frequency: Monthly or less     Comment: rarely   • Drug use: Never   • Sexual activity: Not Currently   Lifestyle   • Physical activity     Days per week: Not on file     Minutes per session: Not on file   • Stress: Not on file   Relationships   • Social connections     Talks on phone: Not on file     Gets together: Not on file     Attends [employer] service: Not on file     Active member of club or organization: Not on file     Attends meetings of clubs or organizations: Not on file     Relationship status: Not on file   • Intimate partner violence     Fear of current or ex partner: Not on file     Emotionally abused: Not on file     Physically abused: Not on file     Forced sexual activity: Not on file   Other Topics Concern   • Not on file   Social History Narrative   • Not on file         Physical Exam:  Ambulatory Vitals  /80 (BP Location: Left arm, Patient Position: Sitting, BP Cuff Size: Adult)   Pulse 96   Resp 16   Ht 1.74 m (5' 8.5\")   Wt 78 kg (172 lb)   SpO2 96%    BP Readings from Last 4 " "Encounters:   10/01/20 126/80   09/30/20 128/80   09/27/20 126/64   09/18/20 128/82     Weight/BMI:   Vitals:    10/01/20 1034   BP: 126/80   Weight: 78 kg (172 lb)   Height: 1.74 m (5' 8.5\")    Body mass index is 25.77 kg/m².  Wt Readings from Last 4 Encounters:   10/01/20 78 kg (172 lb)   09/30/20 78 kg (172 lb)   09/27/20 79.4 kg (175 lb)   09/18/20 78.2 kg (172 lb 6.4 oz)       Physical Exam   Constitutional: He is oriented to person, place, and time and well-developed, well-nourished, and in no distress. No distress.   HENT:   Head: Normocephalic and atraumatic.   Eyes: Pupils are equal, round, and reactive to light. Conjunctivae are normal.   Neck: Normal range of motion. Neck supple. No JVD present.   Cardiovascular: Normal rate, regular rhythm, normal heart sounds and intact distal pulses. Exam reveals no gallop and no friction rub.   No murmur heard.  Pulmonary/Chest: Effort normal and breath sounds normal. No respiratory distress. He has no wheezes. He has no rales. He exhibits no tenderness.   Abdominal: Soft. Bowel sounds are normal. He exhibits no distension.   Musculoskeletal:         General: No edema.   Neurological: He is alert and oriented to person, place, and time.   Skin: Skin is warm and dry.   Psychiatric: Affect and judgment normal.       Lab Data Review:  Lab Results   Component Value Date/Time    CHOLSTRLTOT 246 (H) 09/10/2020 09:00 PM    LDL see below 09/10/2020 09:00 PM    HDL 31 (A) 09/10/2020 09:00 PM    TRIGLYCERIDE 419 (H) 09/10/2020 09:00 PM       Lab Results   Component Value Date/Time    SODIUM 135 09/27/2020 04:45 PM    POTASSIUM 3.8 09/27/2020 04:45 PM    CHLORIDE 97 09/27/2020 04:45 PM    CO2 22 09/27/2020 04:45 PM    GLUCOSE 220 (H) 09/27/2020 04:45 PM    BUN 11 09/27/2020 04:45 PM    CREATININE 0.68 09/27/2020 04:45 PM    BUNCREATRAT 12 11/16/2018 02:44 PM     Estimated Creatinine Clearance: 155 mL/min (by C-G formula based on SCr of 0.68 mg/dL).  Lab Results   Component Value " Date/Time    ALKPHOSPHAT 76 09/27/2020 04:45 PM    ASTSGOT 27 09/27/2020 04:45 PM    ALTSGPT 61 (H) 09/27/2020 04:45 PM    TBILIRUBIN 0.3 09/27/2020 04:45 PM      Lab Results   Component Value Date/Time    WBC 7.4 09/27/2020 04:45 PM     Lab Results   Component Value Date/Time    HBA1C 6.0 (H) 09/10/2020 09:00 PM     No components found for: TROP      Cardiac Imaging and Procedures Review:      EKG 10/1/20 normal sinus    TTE 09/11/20  CONCLUSIONS  No prior study is available for comparison.   Left ventricular ejection fraction is visually estimated to be 60%.  Normal regional wall motion.  No significant valve abnormalities.     Medical Decision Making:  Problem List Items Addressed This Visit     Tachycardia    Relevant Orders    EKG (Completed)    Palpitations    Relevant Orders    EKG (Completed)    ZIO PATCH MONITOR    Familial hyperlipidemia        Tachycardia/palpitations - ruled out for coronary disease given atypical story and trop not elevated x3. Normal cardiac function on TTE. Check zio monitor given symptom frequency. In future can use [employer] device or apple watch to monitor for arrhythmia. Can continue current atenolol if improves symptoms. He strikes me as very anxious despite his mother saying he is generally calm.     Familial hyperlipidemia based on multiple generations on father's side with elevated cholesterol. Continue statin. Refer for genetic screening at [facility] [facility].    It was my pleasure to meet with Mr. Reyes.

## 2020-10-09 ENCOUNTER — HOSPITAL ENCOUNTER (OUTPATIENT)
Dept: LAB | Facility: MEDICAL CENTER | Age: 30
End: 2020-10-09
Attending: FAMILY MEDICINE

## 2020-10-09 DIAGNOSIS — R73.09 ELEVATED GLUCOSE: ICD-10-CM

## 2020-10-09 PROCEDURE — 36415 COLL VENOUS BLD VENIPUNCTURE: CPT

## 2020-10-09 PROCEDURE — 82947 ASSAY GLUCOSE BLOOD QUANT: CPT

## 2020-10-10 LAB
FASTING STATUS PATIENT QL REPORTED: NORMAL
GLUCOSE SERPL-MCNC: 109 MG/DL (ref 65–99)

## 2020-10-13 ENCOUNTER — NON-PROVIDER VISIT (OUTPATIENT)
Dept: CARDIOLOGY | Facility: MEDICAL CENTER | Age: 30
End: 2020-10-13
Attending: INTERNAL MEDICINE

## 2020-10-13 ENCOUNTER — NURSE TRIAGE (OUTPATIENT)
Dept: HEALTH INFORMATION MANAGEMENT | Facility: OTHER | Age: 30
End: 2020-10-13

## 2020-10-13 ENCOUNTER — TELEPHONE (OUTPATIENT)
Dept: CARDIOLOGY | Facility: MEDICAL CENTER | Age: 30
End: 2020-10-13

## 2020-10-13 DIAGNOSIS — R00.2 PALPITATIONS: ICD-10-CM

## 2020-10-13 NOTE — TELEPHONE ENCOUNTER
Regarding: PRESSURE IN HEAD & HEADACHE AND WHEN PT DOES NOT EAT WELL HE DOES NOT FELL GOOD  ----- Message from Armando Ramirez sent at 10/13/2020 11:51 AM PDT -----  PRESSURE IN HEAD & HEADACHE AND WHEN PT DOES NOT EAT WELL HE DOES NOT FELL GOOD

## 2020-10-13 NOTE — TELEPHONE ENCOUNTER
Been to ED x 2, because felt SOB, heart racing, not sure what caused it, neg covid test, feeling weird when he does not eat well or does not eat enough, tension HAs , starting taking some medication, problems may be related to new meds, to include BP med.  SOB over 1 month ago.  Only had SOB when walking in smoke.       1. Caller Name: Rafael Ulysses Zaragoza                 Call Back Number: [phone]  [facility] PCP or Specialty Provider: Yes         2.  In the last two weeks, has the patient had any new or worsening symptoms (not explained by alternative diagnosis)? No.    3.  Does patient have any comoribidities? High cholesterol, pre-diabetes, med to slow down heart rate.      4.  Has the patient traveled in the last 14 days OR had any known contact with someone who is suspected or confirmed to have COVID-19?  No.    5. Disposition: Cleared by RN Triage as potential is low for COVID-19; OK to keep/schedule appointment.  Scheduled appt w/PCP.      Note routed to [facility] Provider: LAITHI only.

## 2020-10-13 NOTE — TELEPHONE ENCOUNTER
Home enrollment completed for the Zio patch program per .  Monitor to be mailed to patient by [employer].    >Currently pending EOS.

## 2020-10-15 ENCOUNTER — APPOINTMENT (OUTPATIENT)
Dept: LAB | Facility: MEDICAL CENTER | Age: 30
End: 2020-10-15
Attending: FAMILY MEDICINE
Payer: OTHER GOVERNMENT

## 2020-10-15 ENCOUNTER — HOSPITAL ENCOUNTER (OUTPATIENT)
Facility: MEDICAL CENTER | Age: 30
End: 2020-10-15
Attending: FAMILY MEDICINE
Payer: OTHER GOVERNMENT

## 2020-10-15 ENCOUNTER — OFFICE VISIT (OUTPATIENT)
Dept: MEDICAL GROUP | Facility: PHYSICIAN GROUP | Age: 30
End: 2020-10-15
Payer: OTHER GOVERNMENT

## 2020-10-15 VITALS
HEIGHT: 69 IN | BODY MASS INDEX: 25.74 KG/M2 | RESPIRATION RATE: 16 BRPM | TEMPERATURE: 99.3 F | SYSTOLIC BLOOD PRESSURE: 134 MMHG | HEART RATE: 112 BPM | DIASTOLIC BLOOD PRESSURE: 86 MMHG | OXYGEN SATURATION: 95 % | WEIGHT: 173.8 LBS

## 2020-10-15 DIAGNOSIS — Z11.59 ENCOUNTER FOR SCREENING FOR OTHER VIRAL DISEASES: ICD-10-CM

## 2020-10-15 DIAGNOSIS — R73.03 PREDIABETES: Primary | ICD-10-CM

## 2020-10-15 DIAGNOSIS — E16.2 HYPOGLYCEMIA: ICD-10-CM

## 2020-10-15 DIAGNOSIS — R00.0 TACHYCARDIA: ICD-10-CM

## 2020-10-15 PROCEDURE — 99214 OFFICE O/P EST MOD 30 MIN: CPT | Performed by: FAMILY MEDICINE

## 2020-10-15 PROCEDURE — C9803 HOPD COVID-19 SPEC COLLECT: HCPCS

## 2020-10-15 PROCEDURE — U0003 INFECTIOUS AGENT DETECTION BY NUCLEIC ACID (DNA OR RNA); SEVERE ACUTE RESPIRATORY SYNDROME CORONAVIRUS 2 (SARS-COV-2) (CORONAVIRUS DISEASE [COVID-19]), AMPLIFIED PROBE TECHNIQUE, MAKING USE OF HIGH THROUGHPUT TECHNOLOGIES AS DESCRIBED BY CMS-2020-01-R: HCPCS

## 2020-10-15 ASSESSMENT — FIBROSIS 4 INDEX: FIB4 SCORE: 0.4

## 2020-10-15 NOTE — ASSESSMENT & PLAN NOTE
"This is an intermittent condition. He has noted pressure headaches, squeezing sensation. He wonders if it is related to the atenolol. When he exercises and gets his HR up and his brain will feel like \"it is being squeezed\".   "

## 2020-10-15 NOTE — PATIENT INSTRUCTIONS
For the headache:  - cut the atenolol in half and see if the headache gets better  - if the headache doesn't get better, you can try stopping atenolol    For the lightheaded symptoms:  - it may be due to sugars getting low after eating, so we will check a glucose tolerance test

## 2020-10-15 NOTE — PROGRESS NOTES
"Subjective:     CC: discuss nutrition    HPI:   Rafael Ulysses presents today with     Prediabetes  This is a new diagnosis. A month ago he started making diet changes, cutting out bad food. He cut out sweets, chips, ice cream, candy, soda. He is also eating out less and trying to cook at home more. He has noted if he doesn't eat 3 square meals a day, he will feel \"weird\". At one point he almost passed out. Afterwards he ate and it felt better.     Tachycardia  This is an intermittent condition. He has noted pressure headaches, squeezing sensation. He wonders if it is related to the atenolol. When he exercises and gets his HR up and his brain will feel like \"it is being squeezed\".       Past Medical History:   Diagnosis Date   • Hyperlipidemia        Social History     Tobacco Use   • Smoking status: Never Smoker   • Smokeless tobacco: Never Used   Substance Use Topics   • Alcohol use: Yes     Alcohol/week: 0.6 oz     Types: 1 Cans of beer per week     Frequency: Monthly or less     Comment: rarely   • Drug use: Never       Current Outpatient Medications Ordered in Epic   Medication Sig Dispense Refill   • atenolol (TENORMIN) 25 MG Tab Take 1 Tab by mouth every day. 30 Tab 1   • simvastatin (ZOCOR) 20 MG Tab Take 1 Tab by mouth every evening. 30 Tab 11     No current [facility]-ordered facility-administered medications on file.        Allergies:  Patient has no known allergies.    ROS:  Pulm: no sob  CV: no chest pain    Objective:     Exam:  /86 (BP Location: Left arm, Patient Position: Sitting, BP Cuff Size: Adult)   Pulse (!) 112   Temp 37.4 °C (99.3 °F) (Temporal)   Resp 16   Ht 1.74 m (5' 8.5\")   Wt 78.8 kg (173 lb 12.8 oz)   SpO2 95%   BMI 26.04 kg/m²  Body mass index is 26.04 kg/m².    Gen: Alert and oriented, No apparent distress.  Neck: Neck is supple without lymphadenopathy.  Lungs: Normal effort, CTA bilaterally, no wheezes, rhonchi, or rales  CV: Regular rate and rhythm. No murmurs, rubs, or " gallops.  Ext: No clubbing, cyanosis, edema.    Assessment & Plan:     29 y.o. male with the following -     1. Prediabetes  This is a new diagnosis.  During his recent hospitalization his hemoglobin A1c was noted to be elevated at 6.0%.  Per his request, we did a fasting sugar to verify which was elevated at 109.  He states last month he has been cutting out all of his junk food and sweets.  He is also eating out less.  We will continue to monitor this yearly.    2. Tachycardia  This is an acute condition.  He continues to have tachycardia in her office today.  He was not tachycardic when he saw the cardiologist had an EKG.  They have ordered a ZIO patch to see if there is any episodes of arrhythmia that explain his episodes of tachycardia.  He is on atenolol daily with his last dose last night.  He does note that he is been getting a pressure headache ever since he started the atenolol and he will get this sensation when he tries to exercise as well.  Less than 1% of people did report headache on the atenolol.  -Try half dose of atenolol daily  -Continue to follow with cardiology as directed    3. Hypoglycemia  While we were discussing his diet changes for the prediabetes, he mentioned he has had some dizziness/lightheadedness episodes where he is presyncopal.  These episodes can occur randomly but he is noticed the most when he skipped a meal or when is been a couple hours since his last meal.  He states he is checked his sugars during these episodes and they have ranged  so no evidence of hypoglycemia however, I wonder if there is a component postprandial hypoglycemia since he gets symptoms after eating and his symptoms will improve when he has a snack.  Therefore, related to a 3-hour glucose tolerance test to evaluate for postprandial hypoglycemia.  - GLUCOSE TOLERANCE TEST (EA ADD); Future    4. Encounter for screening for other viral diseases  Due to the headache, he and his mother are both concerned  that he may have COVID-19 despite a negative test a month ago while in the hospital.  Therefore, I have reordered a COVID-19 test.  He has no other symptoms to indicate COVID-19 infection at this time.  - COVID/SARS CoV-2 PCR; Future      Return in about 4 weeks (around 11/12/2020) for f/u labs, symptoms - 40 MINUTES.    Please note that this dictation was created using voice recognition software. I have made every reasonable attempt to correct obvious errors, but I expect that there are errors of grammar and possibly content that I did not discover before finalizing the note.

## 2020-10-15 NOTE — ASSESSMENT & PLAN NOTE
"This is a new diagnosis. A month ago he started making diet changes, cutting out bad food. He cut out sweets, chips, ice cream, candy, soda. He is also eating out less and trying to cook at home more. He has noted if he doesn't eat 3 square meals a day, he will feel \"weird\". At one point he almost passed out. Afterwards he ate and it felt better.   "

## 2020-10-16 LAB
COVID ORDER STATUS COVID19: NORMAL
SARS-COV-2 RNA RESP QL NAA+PROBE: NOTDETECTED
SPECIMEN SOURCE: NORMAL

## 2020-11-09 PROCEDURE — 0298T PR EXT ECG > 48HR TO 21 DAY REVIEW AND INTERPRETATN: CPT | Performed by: INTERNAL MEDICINE

## 2020-11-09 PROCEDURE — 0296T PR EXT ECG > 48HR TO 21 DAY RCRD W/CONECT INTL RCRD: CPT | Performed by: INTERNAL MEDICINE

## 2020-11-18 ENCOUNTER — HOSPITAL ENCOUNTER (EMERGENCY)
Facility: MEDICAL CENTER | Age: 30
End: 2020-11-18
Attending: EMERGENCY MEDICINE
Payer: COMMERCIAL

## 2020-11-18 VITALS
WEIGHT: 170 LBS | HEART RATE: 107 BPM | SYSTOLIC BLOOD PRESSURE: 134 MMHG | RESPIRATION RATE: 15 BRPM | OXYGEN SATURATION: 94 % | DIASTOLIC BLOOD PRESSURE: 80 MMHG | BODY MASS INDEX: 25.76 KG/M2 | HEIGHT: 68 IN | TEMPERATURE: 97.4 F

## 2020-11-18 LAB
ALBUMIN SERPL BCP-MCNC: 5 G/DL (ref 3.2–4.9)
ALBUMIN/GLOB SERPL: 1.7 G/DL
ALP SERPL-CCNC: 81 U/L (ref 30–99)
ALT SERPL-CCNC: 45 U/L (ref 2–50)
ANION GAP SERPL CALC-SCNC: 13 MMOL/L (ref 7–16)
AST SERPL-CCNC: 20 U/L (ref 12–45)
BASOPHILS # BLD AUTO: 0.9 % (ref 0–1.8)
BASOPHILS # BLD: 0.07 K/UL (ref 0–0.12)
BILIRUB SERPL-MCNC: 0.2 MG/DL (ref 0.1–1.5)
BUN SERPL-MCNC: 11 MG/DL (ref 8–22)
CALCIUM SERPL-MCNC: 10.2 MG/DL (ref 8.5–10.5)
CHLORIDE SERPL-SCNC: 100 MMOL/L (ref 96–112)
CO2 SERPL-SCNC: 26 MMOL/L (ref 20–33)
CREAT SERPL-MCNC: 0.69 MG/DL (ref 0.5–1.4)
EKG IMPRESSION: NORMAL
EOSINOPHIL # BLD AUTO: 0.09 K/UL (ref 0–0.51)
EOSINOPHIL NFR BLD: 1.1 % (ref 0–6.9)
ERYTHROCYTE [DISTWIDTH] IN BLOOD BY AUTOMATED COUNT: 41.4 FL (ref 35.9–50)
GLOBULIN SER CALC-MCNC: 3 G/DL (ref 1.9–3.5)
GLUCOSE BLD-MCNC: 111 MG/DL (ref 65–99)
GLUCOSE SERPL-MCNC: 137 MG/DL (ref 65–99)
HCT VFR BLD AUTO: 48.2 % (ref 42–52)
HGB BLD-MCNC: 16.3 G/DL (ref 14–18)
IMM GRANULOCYTES # BLD AUTO: 0.11 K/UL (ref 0–0.11)
IMM GRANULOCYTES NFR BLD AUTO: 1.4 % (ref 0–0.9)
LYMPHOCYTES # BLD AUTO: 1.38 K/UL (ref 1–4.8)
LYMPHOCYTES NFR BLD: 17.3 % (ref 22–41)
MAGNESIUM SERPL-MCNC: 2.1 MG/DL (ref 1.5–2.5)
MCH RBC QN AUTO: 30.1 PG (ref 27–33)
MCHC RBC AUTO-ENTMCNC: 33.8 G/DL (ref 33.7–35.3)
MCV RBC AUTO: 89.1 FL (ref 81.4–97.8)
MONOCYTES # BLD AUTO: 0.49 K/UL (ref 0–0.85)
MONOCYTES NFR BLD AUTO: 6.1 % (ref 0–13.4)
NEUTROPHILS # BLD AUTO: 5.83 K/UL (ref 1.82–7.42)
NEUTROPHILS NFR BLD: 73.2 % (ref 44–72)
NRBC # BLD AUTO: 0 K/UL
NRBC BLD-RTO: 0 /100 WBC
PHOSPHATE SERPL-MCNC: 1.9 MG/DL (ref 2.5–4.5)
PLATELET # BLD AUTO: 303 K/UL (ref 164–446)
PMV BLD AUTO: 10.6 FL (ref 9–12.9)
POTASSIUM SERPL-SCNC: 3.7 MMOL/L (ref 3.6–5.5)
PROT SERPL-MCNC: 8 G/DL (ref 6–8.2)
RBC # BLD AUTO: 5.41 M/UL (ref 4.7–6.1)
SODIUM SERPL-SCNC: 139 MMOL/L (ref 135–145)
TSH SERPL DL<=0.005 MIU/L-ACNC: 1.12 UIU/ML (ref 0.38–5.33)
WBC # BLD AUTO: 8 K/UL (ref 4.8–10.8)

## 2020-11-18 PROCEDURE — 84443 ASSAY THYROID STIM HORMONE: CPT

## 2020-11-18 PROCEDURE — 84100 ASSAY OF PHOSPHORUS: CPT

## 2020-11-18 PROCEDURE — U0003 INFECTIOUS AGENT DETECTION BY NUCLEIC ACID (DNA OR RNA); SEVERE ACUTE RESPIRATORY SYNDROME CORONAVIRUS 2 (SARS-COV-2) (CORONAVIRUS DISEASE [COVID-19]), AMPLIFIED PROBE TECHNIQUE, MAKING USE OF HIGH THROUGHPUT TECHNOLOGIES AS DESCRIBED BY CMS-2020-01-R: HCPCS

## 2020-11-18 PROCEDURE — 93005 ELECTROCARDIOGRAM TRACING: CPT | Performed by: EMERGENCY MEDICINE

## 2020-11-18 PROCEDURE — 80053 COMPREHEN METABOLIC PANEL: CPT

## 2020-11-18 PROCEDURE — 85025 COMPLETE CBC W/AUTO DIFF WBC: CPT

## 2020-11-18 PROCEDURE — 83735 ASSAY OF MAGNESIUM: CPT

## 2020-11-18 PROCEDURE — 82962 GLUCOSE BLOOD TEST: CPT

## 2020-11-18 PROCEDURE — 93005 ELECTROCARDIOGRAM TRACING: CPT

## 2020-11-18 PROCEDURE — 99284 EMERGENCY DEPT VISIT MOD MDM: CPT

## 2020-11-18 RX ORDER — LORAZEPAM 2 MG/ML
2 INJECTION INTRAMUSCULAR ONCE
Status: DISCONTINUED | OUTPATIENT
Start: 2020-11-18 | End: 2020-11-19 | Stop reason: HOSPADM

## 2020-11-18 ASSESSMENT — FIBROSIS 4 INDEX: FIB4 SCORE: 0.42

## 2020-11-19 NOTE — ED PROVIDER NOTES
ED Provider Note    CHIEF COMPLAINT  Chief Complaint   Patient presents with   • Near Syncopal     was stading in his kitchen, felt like the room was spinning, felt dizzy, felt harder to breathe and thought he was going to pass out. has seen cardiologist for palpitations in the past, wore a Holter monitor with no findings   • Anxiety     very anxious on EMS arrival, took him several minutes to be able to tell EMS why he had called. told EMS that the doctors believe he has anxiety but no diagnosis       HPI  Rafael Ulysses Zaragoza is a 30 y.o. male who presents to the emergency department with complaint of near syncope and 8 anxiety.  The patient states that he went to work today, was feeling okay when he came home he had an episode where he felt like he was going to pass out.  He did feel palpitations and his heart became extremely anxious.  He denied chest pain, shortness of breath, fever, lightness, dizziness, visual changes.  He did complain of a slight headache that has been increasing severity and frequency over the last several months after he started taking atenolol for his palpitations as well as anxiety.  He has been decreasing his atenolol use recently and his symptoms of headache have decreased although his palpitations have increased.  He does see a cardiologist for this and has had an Holter monitor that was negative for significant ectopy.    REVIEW OF SYSTEMS  Positives as above. Pertinent negatives include fever, chest pain, nausea, vomiting, shortness of breath, back pain, abdominal pain.  All other 10 review of systems are negative    PAST MEDICAL HISTORY  Past Medical History:   Diagnosis Date   • Hyperlipidemia        FAMILY HISTORY  Noncontributory    SOCIAL HISTORY  Social History     Socioeconomic History   • Marital status: Single     Spouse name: Not on file   • Number of children: Not on file   • Years of education: Not on file   • Highest education level: Not on file   Occupational  "History   • Not on file   Social Needs   • Financial resource strain: Not on file   • Food insecurity     Worry: Never true     Inability: Never true   • Transportation needs     Medical: No     Non-medical: No   Tobacco Use   • Smoking status: Never Smoker   • Smokeless tobacco: Never Used   Substance and Sexual Activity   • Alcohol use: Yes     Alcohol/week: 0.6 oz     Types: 1 Cans of beer per week     Frequency: Monthly or less     Comment: rarely   • Drug use: Never   • Sexual activity: Not Currently   Lifestyle   • Physical activity     Days per week: Not on file     Minutes per session: Not on file   • Stress: Not on file   Relationships   • Social connections     Talks on phone: Not on file     Gets together: Not on file     Attends Denominational service: Not on file     Active member of club or organization: Not on file     Attends meetings of clubs or organizations: Not on file     Relationship status: Not on file   • Intimate partner violence     Fear of current or ex partner: Not on file     Emotionally abused: Not on file     Physically abused: Not on file     Forced sexual activity: Not on file   Other Topics Concern   • Not on file   Social History Narrative   • Not on file       SURGICAL HISTORY  Past Surgical History:   Procedure Laterality Date   • DENTAL EXTRACTION(S)  2004       CURRENT MEDICATIONS  Home Medications     Reviewed by Guy Bettencourt R.N. (Registered Nurse) on 11/18/20 at 1756  Med List Status: <None>   Medication Last Dose Status   atenolol (TENORMIN) 25 MG Tab  Active   atorvastatin (LIPITOR) 10 MG Tab  Active                ALLERGIES  No Known Allergies    PHYSICAL EXAM  VITAL SIGNS: /80   Pulse (!) 107   Temp 36.3 °C (97.4 °F) (Temporal)   Resp 15   Ht 1.727 m (5' 8\")   Wt 77.1 kg (170 lb)   SpO2 94%   BMI 25.85 kg/m²      Constitutional: Well developed, Well nourished, No acute distress, Non-toxic appearance.   Eyes: PERRLA, EOMI, Conjunctiva normal, No " discharge.   Cardiovascular: Normal heart rate, Normal rhythm, No murmurs, No rubs, No gallops, and intact distal pulses.   Thorax & Lungs:  No respiratory distress, no rales, no rhonchi, No wheezing, No chest wall tenderness.   Abdomen: Bowel sounds normal, Soft, No tenderness, No guarding, No rebound, No pulsatile masses.   Skin: Warm, Dry, No erythema, No rash.   Extremities: Full range of motion, no deformity, no edema.  Neurologic: Alert & oriented x 3, No focal deficits noted, acting appropriately on exam.  Psychiatric: Anxious affect      RADIOLOGY/PROCEDURES  Results for orders placed or performed during the hospital encounter of 11/18/20   CBC WITH DIFFERENTIAL   Result Value Ref Range    WBC 8.0 4.8 - 10.8 K/uL    RBC 5.41 4.70 - 6.10 M/uL    Hemoglobin 16.3 14.0 - 18.0 g/dL    Hematocrit 48.2 42.0 - 52.0 %    MCV 89.1 81.4 - 97.8 fL    MCH 30.1 27.0 - 33.0 pg    MCHC 33.8 33.7 - 35.3 g/dL    RDW 41.4 35.9 - 50.0 fL    Platelet Count 303 164 - 446 K/uL    MPV 10.6 9.0 - 12.9 fL    Neutrophils-Polys 73.20 (H) 44.00 - 72.00 %    Lymphocytes 17.30 (L) 22.00 - 41.00 %    Monocytes 6.10 0.00 - 13.40 %    Eosinophils 1.10 0.00 - 6.90 %    Basophils 0.90 0.00 - 1.80 %    Immature Granulocytes 1.40 (H) 0.00 - 0.90 %    Nucleated RBC 0.00 /100 WBC    Neutrophils (Absolute) 5.83 1.82 - 7.42 K/uL    Lymphs (Absolute) 1.38 1.00 - 4.80 K/uL    Monos (Absolute) 0.49 0.00 - 0.85 K/uL    Eos (Absolute) 0.09 0.00 - 0.51 K/uL    Baso (Absolute) 0.07 0.00 - 0.12 K/uL    Immature Granulocytes (abs) 0.11 0.00 - 0.11 K/uL    NRBC (Absolute) 0.00 K/uL   CMP   Result Value Ref Range    Sodium 139 135 - 145 mmol/L    Potassium 3.7 3.6 - 5.5 mmol/L    Chloride 100 96 - 112 mmol/L    Co2 26 20 - 33 mmol/L    Anion Gap 13.0 7.0 - 16.0    Glucose 137 (H) 65 - 99 mg/dL    Bun 11 8 - 22 mg/dL    Creatinine 0.69 0.50 - 1.40 mg/dL    Calcium 10.2 8.5 - 10.5 mg/dL    AST(SGOT) 20 12 - 45 U/L    ALT(SGPT) 45 2 - 50 U/L    Alkaline  Phosphatase 81 30 - 99 U/L    Total Bilirubin 0.2 0.1 - 1.5 mg/dL    Albumin 5.0 (H) 3.2 - 4.9 g/dL    Total Protein 8.0 6.0 - 8.2 g/dL    Globulin 3.0 1.9 - 3.5 g/dL    A-G Ratio 1.7 g/dL   TSH   Result Value Ref Range    TSH 1.120 0.380 - 5.330 uIU/mL   MAGNESIUM   Result Value Ref Range    Magnesium 2.1 1.5 - 2.5 mg/dL   PHOSPHORUS   Result Value Ref Range    Phosphorus 1.9 (L) 2.5 - 4.5 mg/dL   ESTIMATED GFR   Result Value Ref Range    GFR If African American >60 >60 mL/min/1.73 m 2    GFR If Non African American >60 >60 mL/min/1.73 m 2   COVID/SARS CoV-2 PCR   Result Value Ref Range    COVID Order Status Received    SARS-CoV-2, PCR (In-House)   Result Value Ref Range    SARS-CoV-2 Source NP Swab     SARS-CoV-2 by PCR NotDetected    ACCU-CHEK GLUCOSE   Result Value Ref Range    Glucose - Accu-Ck 111 (H) 65 - 99 mg/dL   EKG (NOW)   Result Value Ref Range    Report       [facility] Emergency Dept.    Test Date:  2020  Pt Name:    RAFAEL ULYSSES ZARAGOZA      Department: ER  MRN:        [id number]                      Room:       [facility]  Gender:     Male                         Technician: [id number]  :        1990                   Requested By:ER TRIAGE PROTOCOL  Order #:    [id number]                    Reading MD: JEAN CARLOS SEQUEIRA DO    Measurements  Intervals                                Axis  Rate:       105                          P:          19  OK:         192                          QRS:        52  QRSD:       102                          T:          132  QT:         312  QTc:        413    Interpretive Statements  SINUS TACHYCARDIA  NONSPECIFIC T ABNORMALITIES, LATERAL LEADS  BORDERLINE ST ELEVATION, ANTERIOR LEADS  Compared to ECG 10/01/2020 10:39:01  T-wave abnormality now present  ST (T wave) deviation now present  Sinus rhythm no longer present  Electronically Signed On 2020 18:01:06 PST by KYAW SEQUEIRA DO           COURSE & MEDICAL DECISION  MAKING  Pertinent Labs & Imaging studies reviewed. (See chart for details)  This is a pleasant 30-year-old male presents with tachycardia, near syncope and severe anxiety.  Here in the emergency department, the patient received Ativan 2 mg IV and had significant reduction in his anxious behavior.  I had a long conversation with him concerning his sinus tachycardia and he will be following up with the audiologist in 1 day.  Not suspect the patient had a pulmonary embolism as he has been here before for this, is following with cardiology, has no chest pain, no significant risk factors for pulmonary emboli.  The patient is probably experiencing an anxious reaction to his palpitations resulting in his tachycardia.  The patient will be following up with his primary care doctor his cardiologist for further evaluation and management.  The patient is instructed return to the emergency [place] if he has increasing symptomatology.    FINAL IMPRESSION  Palpitations  Anxiety    Instructed to return to the emergency department for increasing symptoms.       Electronically signed by: Blue Chen D.O., 11/18/2020 6:32 PM

## 2020-11-19 NOTE — ED TRIAGE NOTES
"Chief Complaint   Patient presents with   • Near Syncopal     was stading in his kitchen, felt like the room was spinning, felt dizzy, felt harder to breathe and thought he was going to pass out. has seen cardiologist for palpitations in the past, wore a Holter monitor with no findings   • Anxiety     very anxious on EMS arrival, took him several minutes to be able to tell EMS why he had called. told EMS that the doctors believe he has anxiety but no diagnosis       Pt brought in by EMS for above.    /80   Pulse (!) 107   Temp 36.3 °C (97.4 °F) (Temporal)   Resp 15   Ht 1.727 m (5' 8\")   Wt 77.1 kg (170 lb)   SpO2 94%   BMI 25.85 kg/m²   "

## 2020-11-25 ENCOUNTER — TELEMEDICINE (OUTPATIENT)
Dept: MEDICAL GROUP | Facility: PHYSICIAN GROUP | Age: 30
End: 2020-11-25

## 2020-11-25 VITALS — WEIGHT: 173 LBS | BODY MASS INDEX: 25.62 KG/M2 | HEIGHT: 69 IN

## 2020-11-25 DIAGNOSIS — R00.2 PALPITATIONS: Primary | ICD-10-CM

## 2020-11-25 DIAGNOSIS — E78.49 FAMILIAL HYPERLIPIDEMIA: ICD-10-CM

## 2020-11-25 DIAGNOSIS — R51.9 NONINTRACTABLE HEADACHE, UNSPECIFIED CHRONICITY PATTERN, UNSPECIFIED HEADACHE TYPE: ICD-10-CM

## 2020-11-25 PROCEDURE — 99214 OFFICE O/P EST MOD 30 MIN: CPT | Mod: 95,CR | Performed by: FAMILY MEDICINE

## 2020-11-25 RX ORDER — ATENOLOL 25 MG/1
12.5 TABLET ORAL DAILY
Qty: 30 TAB | Refills: 0 | Status: SHIPPED
Start: 2020-11-25 | End: 2021-09-24

## 2020-11-25 ASSESSMENT — FIBROSIS 4 INDEX: FIB4 SCORE: 0.31

## 2020-11-25 NOTE — ASSESSMENT & PLAN NOTE
"This is a chronic condition. He is on atenolol for multiple, frequent heart palpitations. He has been taking half a tablet of the atenolol with no change in atenolol. He stopped for 4 days, no improvement in headache, but worse palpitations, so he restarted it. He is tapering off and plans to go down to 1/2 tablet tomorrow.     He notes an episode yesterday with a \"weird pressure\" on the left side of the head. Hands were getting numb and HR was increasing.   "

## 2020-11-25 NOTE — ASSESSMENT & PLAN NOTE
This is a chronic condition. He was started on simvastatin. At his last appointment I switched him to atorvastatin as it doesn't have headache as a side effect. Today he reports he never switched and stayed with simvastatin. He hasn't taken it for the last 4 days because he felt horrible after a dose and needed to eat food to feel better.

## 2020-11-25 NOTE — TELEPHONE ENCOUNTER
----- Message from Rafael Ulysses Zaragoza sent at 11/25/2020 11:55 AM PST -----  Regarding: Prescription Question  Contact: [phone]  Hi Dr. Welsh. Could I do a refill of the Atenonol? Like I said before I've been slowly reducing the amount I take but will need a bit more still. Thanks.

## 2020-11-25 NOTE — ASSESSMENT & PLAN NOTE
This is a chronic condition. He is on atenolol for multiple, frequent heart palpitations. He has been taking half a tablet of the atenolol with no change in atenolol. He stopped for 4 days, no improvement in headache, but worse palpitations, so he restarted it. He is tapering off and plans to go down to 1/2 tablet tomorrow.

## 2020-11-25 NOTE — PROGRESS NOTES
"Virtual Visit: Established Patient   This visit was conducted via Zoom using secure and encrypted videoconferencing technology. The patient was in a private location in the state of [place].    The patient's identity was confirmed and verbal consent was obtained for this virtual visit.    Subjective:   CC:   Chief Complaint   Patient presents with   • Head Ache   • Medication Management     questions about side effects from meds       Rafael Ulysses Zaragoza is a 30 y.o. male presenting for evaluation and management of:    Palpitations  This is a chronic condition. He is on atenolol for multiple, frequent heart palpitations. He has been taking half a tablet of the atenolol with no change in atenolol. He stopped for 4 days, no improvement in headache, but worse palpitations, so he restarted it. He is tapering off and plans to go down to 1/2 tablet tomorrow.     He notes an episode yesterday with a \"weird pressure\" on the left side of the head. Hands were getting numb and HR was increasing.     Familial hyperlipidemia  This is a chronic condition. He was started on simvastatin. At his last appointment I switched him to atorvastatin as it doesn't have headache as a side effect. Today he reports he never switched and stayed with simvastatin. He hasn't taken it for the last 4 days because he felt horrible after a dose and needed to eat food to feel better.       ROS   Denies any recent fevers or chills. No chest pains or shortness of breath.     No Known Allergies    Current medicines (including changes today)  Current Outpatient Medications   Medication Sig Dispense Refill   • atorvastatin (LIPITOR) 10 MG Tab Take 1 Tab by mouth every day. 90 Tab 0   • atenolol (TENORMIN) 25 MG Tab Take 1 Tab by mouth every day. (Patient taking differently: Take 12.5 mg by mouth every day.) 30 Tab 1     No current facility-administered medications for this visit.        Patient Active Problem List    Diagnosis Date Noted   • Tachycardia " "09/11/2020     Priority: High   • Prediabetes 09/11/2020     Priority: Medium   • Familial hyperlipidemia 10/01/2020   • Myalgia 11/09/2018   • Palpitations 11/09/2018   • Twitching 11/09/2018   • Elevated blood pressure reading in office without diagnosis of hypertension 11/09/2018       Family History   Problem Relation Age of Onset   • Diabetes Mother    • Diabetes Father    • Heart Disease Father    • No Known Problems Brother    • Cancer Maternal Grandmother         pancreatic   • Stroke Paternal Grandfather        He  has a past medical history of Hyperlipidemia.  He  has a past surgical history that includes dental extraction(s) (2004).       Objective:   Ht 1.74 m (5' 8.5\") Comment: Patient reported  Wt 78.5 kg (173 lb) Comment: Patient reported  BMI 25.92 kg/m²  RR: 14    Physical Exam:  Constitutional: Alert, no distress, well-groomed.  Skin: No rashes in visible areas.  Eye: Round. Conjunctiva clear, lids normal. No icterus.   ENMT: Lips pink without lesions, good dentition, moist mucous membranes. Phonation normal.  Neck: No masses, no thyromegaly. Moves freely without pain.  Respiratory: Unlabored respiratory effort, no cough or audible wheeze  Psych: Alert and oriented x3, normal affect and mood.       Assessment and Plan:   The following treatment plan was discussed:     1. Palpitations  This is a chronic condition.  Since September, 2020 he has had significant heart palpitations.  At one point he had significant sinus tachycardia while in the ER.  He was prescribed atenolol and ever since that prescription along with simvastatin for hypercholesterolemia, he has been getting significant headaches (see below for details).  He tried stopping the atenolol to see if the headaches would go away but got worsening palpitations and is back on it recently taper himself off.  He has not discussed this with cardiology yet and sees them in a couple of weeks.  -Continue atenolol    2. Familial " "hyperlipidemia  This is a chronic condition, stable.  He was prescribed simvastatin.  At his last appointment we did discuss that headache is a known side effect of simvastatin so I switched him to atorvastatin.  However, today he reports that he never did switch the atorvastatin and start with the simvastatin.  He had a \"weird episode\" yesterday and now decides he is can switch to atorvastatin see if he tolerates it better.    3. Nonintractable headache, unspecified chronicity pattern, unspecified headache type  This is an acute condition.  Since starting atenolol and simvastatin has been getting headaches and head pressure and some other head symptoms.  Both medications can cause headaches and I tried to switch him to atorvastatin but he had not done that yet.  He did try stopping the atenolol and did not notice an improvement in his had symptoms and his heart palpitations got worse back on atenolol.  He states yesterday had a \"weird pressure\" on the left side of his head and his hands were getting rate increasing.  He has been to the ER 3 times since September for different symptoms.  I strongly suspect there is a component of anxiety.  His last ER note mentions they gave him Ativan and his anxious behavior improved but he tells me that he never received the Ativan as he did not want it.  He is not convinced there is an anxiety component though I did try to explain to him a lot of his symptoms could be related to anxiety and the anxiety could started after the first tachycardia episode and now he stresses out about his heart.  He is extremely worried about these head symptoms and would like to get an MRI.  I am not certain MRI is necessary as when he came to neurology to discuss this further as it could be an effect of medication and/or anxiety.  - REFERRAL TO NEUROLOGY    Follow-up: Return if symptoms worsen or fail to improve.         "

## 2021-01-14 ENCOUNTER — OFFICE VISIT (OUTPATIENT)
Dept: NEUROLOGY | Facility: MEDICAL CENTER | Age: 31
End: 2021-01-14
Attending: STUDENT IN AN ORGANIZED HEALTH CARE EDUCATION/TRAINING PROGRAM

## 2021-01-14 VITALS
TEMPERATURE: 98.8 F | WEIGHT: 172.4 LBS | DIASTOLIC BLOOD PRESSURE: 80 MMHG | HEIGHT: 68 IN | OXYGEN SATURATION: 98 % | SYSTOLIC BLOOD PRESSURE: 118 MMHG | HEART RATE: 105 BPM | BODY MASS INDEX: 26.13 KG/M2

## 2021-01-14 DIAGNOSIS — G44.89 OTHER HEADACHE SYNDROME: ICD-10-CM

## 2021-01-14 DIAGNOSIS — R03.0 ELEVATED BLOOD PRESSURE READING IN OFFICE WITHOUT DIAGNOSIS OF HYPERTENSION: ICD-10-CM

## 2021-01-14 DIAGNOSIS — R73.01 IMPAIRED FASTING BLOOD SUGAR: ICD-10-CM

## 2021-01-14 DIAGNOSIS — R00.0 TACHYCARDIA: ICD-10-CM

## 2021-01-14 DIAGNOSIS — R41.82 ALTERED MENTAL STATUS, UNSPECIFIED ALTERED MENTAL STATUS TYPE: ICD-10-CM

## 2021-01-14 PROCEDURE — 99204 OFFICE O/P NEW MOD 45 MIN: CPT | Performed by: STUDENT IN AN ORGANIZED HEALTH CARE EDUCATION/TRAINING PROGRAM

## 2021-01-14 ASSESSMENT — FIBROSIS 4 INDEX: FIB4 SCORE: 0.31

## 2021-01-14 NOTE — PROGRESS NOTES
GENERAL NEUROLOGY CLINIC INITIAL ENCOUNTER  CHIEF COMPLAINT(S): Headaches    History of present illness:  Rafael Ulysses Zaragoza 30 y.o. male presents today for spells of dizziness, SOB, palpipations. Think it may have been from    Started a coupole of months had dizziness, SOB while in early Sept. Weeks later had palpitations. Cardiac work-up negative. Think symptoms get better with food and are provoked with not eating. Was tried on atenolol but thinks his headaches were worse.    Has heqadaches too. Pressure like. Daily. Ongoing for months. Describes it as blood rushing to head. Notices it when working out. Whole head    Been off atenonlol for a months.    Since coming off of atenolol headaches have improved.              Patient's PMH, PSH, FH, and SH were reviewed.    Medications and allergies were reviewed.        Past medical history:   Past Medical History:   Diagnosis Date   • Hyperlipidemia        Past surgical history:   Past Surgical History:   Procedure Laterality Date   • DENTAL EXTRACTION(S)  2004       Family history:   Family History   Problem Relation Age of Onset   • Diabetes Mother    • Diabetes Father    • Heart Disease Father    • No Known Problems Brother    • Cancer Maternal Grandmother         pancreatic   • Stroke Paternal Grandfather        Social history:   Social History     Socioeconomic History   • Marital status: Single     Spouse name: Not on file   • Number of children: Not on file   • Years of education: Not on file   • Highest education level: Not on file   Occupational History   • Not on file   Social Needs   • Financial resource strain: Not on file   • Food insecurity     Worry: Never true     Inability: Never true   • Transportation needs     Medical: No     Non-medical: No   Tobacco Use   • Smoking status: Never Smoker   • Smokeless tobacco: Never Used   Substance and Sexual Activity   • Alcohol use: Yes     Alcohol/week: 0.6 oz     Types: 1 Cans of beer per week      Frequency: Monthly or less     Comment: rarely   • Drug use: Never   • Sexual activity: Not Currently   Lifestyle   • Physical activity     Days per week: Not on file     Minutes per session: Not on file   • Stress: Not on file   Relationships   • Social connections     Talks on phone: Not on file     Gets together: Not on file     Attends [employer] service: Not on file     Active member of club or organization: Not on file     Attends meetings of clubs or organizations: Not on file     Relationship status: Not on file   • Intimate partner violence     Fear of current or ex partner: Not on file     Emotionally abused: Not on file     Physically abused: Not on file     Forced sexual activity: Not on file   Other Topics Concern   • Not on file   Social History Narrative   • Not on file       Current medications:   Current Outpatient Medications   Medication   • atorvastatin (LIPITOR) 10 MG Tab   • atenolol (TENORMIN) 25 MG Tab     No current facility-administered medications for this visit.        Medication Allergy:  No Known Allergies      Review of systems:   Pertinent positives and negatives are as outlined above      Physical examination:   Vitals:    01/14/21 1259   BP: 118/80   Pulse: (!) 105   Temp: 37.1 °C (98.8 °F)   SpO2: 98%       General: Patient in no acute distress, pleasant and cooperative.  HEENT: Normocephalic, no signs of acute trauma.   Neck: appears supple, here is normal range of motion. No tenderness on exam.   Chest: clear to auscultation. Symmetrical chest rise with inhalation. No cough.   CV: Rtachycardic, regular rhythm, no murmurs.   Skin: no signs of acute rashes or trauma.   Musculoskeletal: joints exhibit full range of motion. There are no signs of joint or muscle swelling.   Psychiatric: pertinent positives as discussed above    NEUROLOGICAL EXAM:   Mental status:  orientation: Awake, alert and oriented to self, month, year, situation.  Attention: Intact  Visio-spatial testing:  NT  Frontal release signs: Absent  Speech and language: speech is clear and fluent. The patient is able to name, repeat and comprehend. No impairment in fluency. No word substitions or paraphasic errors  Memory: There is intact recollection of recent and remote events.   Cranial nerve exam:   I: smell Not tested   II: visual acuity  OS: crisp discs   OD: crisp discs   II: visual fields Full to confrontation  Visual neglect: absent   II: pupils Equal, round, reactive to light   III,VII: ptosis None   III,IV,VI: extraocular muscles  Full ROM   V: mastication Normal   V: facial light touch sensation  Normal   V,VII: corneal reflex  Not tested   VII: facial muscle function - upper  Normal   VII: facial muscle function - lower Normal   VIII: hearing Not tested   IX: soft palate elevation  Normal   IX,X: gag reflex Not tested   XI: trapezius strength  5/5   XI: sternocleidomastoid strength 5/5   XI: neck flexion strength  5/5   XII: tongue strength  NT     Motor exam:   • Strength is 5/5 in the distal and proximal upper and lower extremities   • Tone is normal.  • No abnormal movements were seen on exam.   • No muscle fasciculation  • No areas of atrophy  • Tests of praxis: NT  Sensory exam reveals normal sense of light touch, proprioception, vibration and pinprick in all extremities. Cortical sensory testing: Normal. Test of neglect: none present to simultaneous stimulation with touch  Deep tendon reflexes:  2+ throughout. Plantar responses are mute There is no clonus.   Coordination: shows a normal finger-nose-finger. Normal rapidly alternating movements. Heel-knee-shin movements smooth and coordinated bilaterally.   Gait:   • The patient was able to get up from seated position on first attempt without requiring assistance.   • Found to be steady when walking.   • Movements were fluid with normal arm swing.   • The patient was able to turn without difficulties or tendency to fall.   • Romberg exam  absent        ANCILLARY DATA REVIEWED:       Lab Data Review:  Reviewed    Records reviewed:   Reviewed    Imaging:   Reviewed    EEG:  NA      ASSESSMENT, PLAN, EDUCATION/COUNSELIN yo with episodes of non-specific dizziness, palpitations, and chronic daily pressure-like headaches with pulsating qualioty. He has been very healthy up until this. Differential is broad and includes migraines, seizures, POTS, panic disorder, hypoglycemia/diabetes. Tests are as below.     Visit Diagnoses     ICD-10-CM   1. Other headache syndrome  G44.89   2. Impaired fasting blood sugar  R73.01   3. Tachycardia  R00.0   4. Elevated blood pressure reading in office without diagnosis of hypertension  R03.0   5. Altered mental status, unspecified altered mental status type  R41.82        Orders Placed This Encounter   • MR-BRAIN-WITH & W/O   • CRP HIGH SENSITIVE   • Sed Rate   • HEMOGLOBIN A1C   • REFERRAL TO NEURODIAGNOSTICS (EEG,EP,EMG/NCS/DBS)          FOLLOW-UP:   3 months          BILLING DOCUMENTATION:       Counseling:  I spent a total of 45 minutes of face-to-face time in this visit. Over 50% of the time of the visit today was spent on counseling and or coordination of care wtih the patient and/or family, as above in assessment in plan.       Dank Fuller MD  Epilepsy and General Neurology  Department of Neurology  Clinical  of Neurology [facility] [employer].

## 2021-01-25 DIAGNOSIS — Z00.00 HEALTHCARE MAINTENANCE: ICD-10-CM

## 2021-01-26 ENCOUNTER — HOSPITAL ENCOUNTER (OUTPATIENT)
Dept: RADIOLOGY | Facility: MEDICAL CENTER | Age: 31
End: 2021-01-26
Attending: STUDENT IN AN ORGANIZED HEALTH CARE EDUCATION/TRAINING PROGRAM

## 2021-01-26 DIAGNOSIS — G44.89 OTHER HEADACHE SYNDROME: ICD-10-CM

## 2021-01-26 DIAGNOSIS — R73.01 IMPAIRED FASTING BLOOD SUGAR: ICD-10-CM

## 2021-01-26 PROCEDURE — A9576 INJ PROHANCE MULTIPACK: HCPCS | Performed by: STUDENT IN AN ORGANIZED HEALTH CARE EDUCATION/TRAINING PROGRAM

## 2021-01-26 PROCEDURE — 70553 MRI BRAIN STEM W/O & W/DYE: CPT

## 2021-01-26 PROCEDURE — 700117 HCHG RX CONTRAST REV CODE 255: Performed by: STUDENT IN AN ORGANIZED HEALTH CARE EDUCATION/TRAINING PROGRAM

## 2021-01-26 RX ADMIN — GADOTERIDOL 15 ML: 279.3 INJECTION, SOLUTION INTRAVENOUS at 08:50

## 2021-01-27 ENCOUNTER — HOSPITAL ENCOUNTER (OUTPATIENT)
Dept: LAB | Facility: MEDICAL CENTER | Age: 31
End: 2021-01-27
Attending: STUDENT IN AN ORGANIZED HEALTH CARE EDUCATION/TRAINING PROGRAM

## 2021-01-27 DIAGNOSIS — G44.89 OTHER HEADACHE SYNDROME: ICD-10-CM

## 2021-01-27 DIAGNOSIS — Z00.00 HEALTHCARE MAINTENANCE: ICD-10-CM

## 2021-01-27 DIAGNOSIS — R73.01 IMPAIRED FASTING BLOOD SUGAR: ICD-10-CM

## 2021-01-27 LAB
CHOLEST SERPL-MCNC: 190 MG/DL (ref 100–199)
ERYTHROCYTE [SEDIMENTATION RATE] IN BLOOD BY WESTERGREN METHOD: 3 MM/HOUR (ref 0–15)
EST. AVERAGE GLUCOSE BLD GHB EST-MCNC: 117 MG/DL
FASTING STATUS PATIENT QL REPORTED: NORMAL
HBA1C MFR BLD: 5.7 % (ref 0–5.6)
HDLC SERPL-MCNC: 28 MG/DL
LDLC SERPL CALC-MCNC: 105 MG/DL
TRIGL SERPL-MCNC: 284 MG/DL (ref 0–149)

## 2021-01-27 PROCEDURE — 80061 LIPID PANEL: CPT

## 2021-01-27 PROCEDURE — 83036 HEMOGLOBIN GLYCOSYLATED A1C: CPT

## 2021-01-27 PROCEDURE — 36415 COLL VENOUS BLD VENIPUNCTURE: CPT

## 2021-01-27 PROCEDURE — 85652 RBC SED RATE AUTOMATED: CPT

## 2021-04-06 ENCOUNTER — IMMUNIZATION (OUTPATIENT)
Dept: FAMILY PLANNING/WOMEN'S HEALTH CLINIC | Facility: IMMUNIZATION CENTER | Age: 31
End: 2021-04-06
Payer: OTHER GOVERNMENT

## 2021-04-06 DIAGNOSIS — Z23 ENCOUNTER FOR VACCINATION: Primary | ICD-10-CM

## 2021-04-06 PROCEDURE — 91300 PFIZER SARS-COV-2 VACCINE: CPT | Performed by: INTERNAL MEDICINE

## 2021-04-06 PROCEDURE — 0001A PFIZER SARS-COV-2 VACCINE: CPT | Performed by: INTERNAL MEDICINE

## 2021-04-29 ENCOUNTER — IMMUNIZATION (OUTPATIENT)
Dept: FAMILY PLANNING/WOMEN'S HEALTH CLINIC | Facility: IMMUNIZATION CENTER | Age: 31
End: 2021-04-29
Payer: OTHER GOVERNMENT

## 2021-04-29 DIAGNOSIS — Z23 ENCOUNTER FOR VACCINATION: Primary | ICD-10-CM

## 2021-04-29 PROCEDURE — 0002A PFIZER SARS-COV-2 VACCINE: CPT | Performed by: INTERNAL MEDICINE

## 2021-04-29 PROCEDURE — 91300 PFIZER SARS-COV-2 VACCINE: CPT | Performed by: INTERNAL MEDICINE

## 2021-08-19 NOTE — ED NOTES
Patient transported to imaging   The patient feels that the cataract is significantly impacting daily activities and has elected cataract surgery. The risks, benefits, and alternatives to surgery were discussed. The patient elects to proceed with surgery.

## 2021-09-24 ENCOUNTER — OFFICE VISIT (OUTPATIENT)
Dept: CARDIOLOGY | Facility: MEDICAL CENTER | Age: 31
End: 2021-09-24

## 2021-09-24 VITALS
OXYGEN SATURATION: 96 % | RESPIRATION RATE: 14 BRPM | HEART RATE: 94 BPM | HEIGHT: 68 IN | WEIGHT: 164 LBS | SYSTOLIC BLOOD PRESSURE: 150 MMHG | BODY MASS INDEX: 24.86 KG/M2 | DIASTOLIC BLOOD PRESSURE: 90 MMHG

## 2021-09-24 DIAGNOSIS — E78.49 FAMILIAL HYPERLIPIDEMIA: ICD-10-CM

## 2021-09-24 PROCEDURE — 99214 OFFICE O/P EST MOD 30 MIN: CPT | Performed by: INTERNAL MEDICINE

## 2021-09-24 ASSESSMENT — ENCOUNTER SYMPTOMS
CONSTIPATION: 0
PND: 0
DEPRESSION: 0
NAUSEA: 0
PALPITATIONS: 1
FEVER: 0
FLANK PAIN: 0
SHORTNESS OF BREATH: 0
HEARTBURN: 0
DECREASED APPETITE: 0
BLURRED VISION: 0
DIZZINESS: 0
ORTHOPNEA: 0
SYNCOPE: 0
BACK PAIN: 0
DIARRHEA: 0
IRREGULAR HEARTBEAT: 0
ABDOMINAL PAIN: 0
VOMITING: 0
CLAUDICATION: 0
COUGH: 0
NEAR-SYNCOPE: 0
WEIGHT LOSS: 0
WEIGHT GAIN: 0
ALTERED MENTAL STATUS: 0
DYSPNEA ON EXERTION: 0

## 2021-09-24 ASSESSMENT — FIBROSIS 4 INDEX: FIB4 SCORE: 0.31

## 2021-09-24 NOTE — PATIENT INSTRUCTIONS
Ivabradine tablets (consideration for palpitations)  What is this medicine?  IVABRADINE (eye VAB [place]) is used for heart failure.  This medicine may be used for other purposes; ask your health care provider or pharmacist if you have questions.  COMMON BRAND NAME(S): [place]  What should I tell my health care provider before I take this medicine?  They need to know if you have any of these conditions:  · certain heart conditions like sick sinus syndrome, sinoatrial block, or third-degree atrioventricular block  · heart failure that has recently worsened  · liver disease  · low blood pressure  · low resting heart rate  · pacemaker  · an unusual or allergic reaction to ivabradine, other medicines, foods, dyes, or preservatives  · pregnant or trying to get pregnant  · breast-feeding  How should I use this medicine?  Take this medicine by mouth with a glass of water. Follow the directions on the prescription label. Take this medicine with food. Avoid grapefruit juice. Take your medicine at regular intervals. Do not take it more often than directed. Do not stop taking except on your doctor's advice.  Talk to your pediatrician regarding the use of this medicine in children. While this drug may be prescribed for children as young as 6 months for selected conditions, precautions do apply.  Overdosage: If you think you have taken too much of this medicine contact a poison control center or emergency room at once.  NOTE: This medicine is only for you. Do not share this medicine with others.  What if I miss a dose?  If you miss a dose, skip it. Take your next dose at the normal time. Do not take extra or 2 doses at the same time to make up for the missed dose.  What may interact with this medicine?  Do not take this medicine with any of the following medications:  · certain medicines for fungal infections like ketoconazole, itraconazole, or posaconazole  · certain antibiotics like clarithromycin and telithromycin  · certain  antivirals for HIV or hepatitis  · ceritinib  · conivaptan  · idelalisib  · nefazodone  · ribociclib  This medicine may also interact with the following medications:  · certain medicines for blood pressure, heart disease, irregular heartbeat  · certain medicines for seizures like phenobarbital and phenytoin  · rifampicin  · Humble's wort  This list may not describe all possible interactions. Give your health care provider a list of all the medicines, herbs, non-prescription drugs, or dietary supplements you use. Also tell them if you smoke, drink alcohol, or use illegal drugs. Some items may interact with your medicine.  What should I watch for while using this medicine?  Visit your healthcare professional for regular checks on your progress. Tell your healthcare professional if your symptoms do not start to get better or if they get worse.  You may experience changes in vision. Use caution if you are driving or using machinery when sudden changes in light intensity may occur, especially while driving at night. This effect may decrease after using this medicine for a long time.  Do not become pregnant while taking this medicine. Women should inform their healthcare professional if they wish to become pregnant or think they might be pregnant. There is a potential for serious side effects and harm to an unborn child. Talk to your health care professional for more information.  What side effects may I notice from receiving this medicine?  Side effects that you should report to your doctor or health care professional as soon as possible:  · allergic reactions like skin rash, itching or hives, swelling of the face, lips, or tongue  · high blood pressure  · signs and symptoms of a dangerous change in heartbeat or heart rhythm like chest pain; dizziness; fast or irregular heartbeat; palpitations; feeling faint or lightheaded; breathing problems  · unusually slow heartbeat  Side effects that usually do not require medical  attention (report to your doctor or health care professional if they continue or are bothersome):  · changes in vision  This list may not describe all possible side effects. Call your doctor for medical advice about side effects. You may report side effects to FDA at 6-949-EEJ-4380.  Where should I keep my medicine?  Keep out of the reach of children.  Store at room temperature between 20 and 25 degrees C (68 and 77 degrees F). Throw away any unused medicine after the expiration date.  NOTE: This sheet is a summary. It may not cover all possible information. If you have questions about this medicine, talk to your doctor, pharmacist, or health care provider.  © 2020 [employer]Gold Standard (2019-08-12 14:04:38)      Sinus Tachycardia    Sinus tachycardia is a kind of fast heartbeat. In sinus tachycardia, the heart beats more than 100 times a minute. Sinus tachycardia starts in a part of the heart called the sinus node. Sinus tachycardia may be harmless, or it may be a sign of a serious condition.  What are the causes?  This condition may be caused by:  · Exercise or exertion.  · A fever.  · Pain.  · Loss of body fluids (dehydration).  · Severe bleeding (hemorrhage).  · Anxiety and stress.  · Certain substances, including:  ? Alcohol.  ? Caffeine.  ? Tobacco and nicotine products.  ? Cold medicines.  ? Illegal drugs.  · Medical conditions including:  ? Heart disease.  ? An infection.  ? An overactive thyroid (hyperthyroidism).  ? A lack of red blood cells (anemia).  What are the signs or symptoms?  Symptoms of this condition include:  · A feeling that the heart is beating quickly (palpitations).  · Suddenly noticing your heartbeat (cardiac awareness).  · Dizziness.  · Tiredness (fatigue).  · Shortness of breath.  · Chest pain.  · Nausea.  · Fainting.  How is this diagnosed?  This condition is diagnosed with:  · A physical exam.  · Other tests, such as:  ? Blood tests.  ? An electrocardiogram (ECG). This test measures  the electrical activity of the heart.  ? Ambulatory cardiac monitor. This records your heartbeats for 24 hours or more.  You may be referred to a heart specialist (cardiologist).  How is this treated?  Treatment for this condition depends on the cause or the underlying condition. Treatment may involve:  · Treating the underlying condition.  · Taking new medicines or changing your current medicines as told by your health care provider.  · Making changes to your diet or lifestyle.  Follow these instructions at home:  Lifestyle    · Do not use any products that contain nicotine or tobacco, such as cigarettes and e-cigarettes. If you need help quitting, ask your health care provider.  · Do not use illegal drugs, such as cocaine.  · Learn relaxation methods to help you when you get stressed or anxious. These include deep breathing.  · Avoid caffeine or other stimulants.  Alcohol use    · Do not drink alcohol if:  ? Your health care provider tells you not to drink.  ? You are pregnant, may be pregnant, or are planning to become pregnant.  · If you drink alcohol, limit how much you have:  ? 0-1 drink a day for women.  ? 0-2 drinks a day for men.  · Be aware of how much alcohol is in your drink. In the [place] one drink equals one typical bottle of beer (12 oz), one-half glass of wine (5 oz), or one shot of hard liquor (1½ oz).  General instructions  · Drink enough fluids to keep your urine pale yellow.  · Take over-the-counter and prescription medicines only as told by your health care provider.  · Keep all follow-up visits as told by your health care provider. This is important.  Contact a health care provider if you have:  · A fever.  · Vomiting or diarrhea that does not go away.  Get help right away if you:  · Have pain in your chest, upper arms, jaw, or neck.  · Become weak or dizzy.  · Feel faint.  · Have palpitations that do not go away.  Summary  · In sinus tachycardia, the heart beats more than 100 times a  minute.  · Sinus tachycardia may be harmless, or it may be a sign of a serious condition.  · Treatment for this condition depends on the cause or the underlying condition.  · Get help right away if you have pain in your chest, upper arms, jaw, or neck.  This information is not intended to replace advice given to you by your health care provider. Make sure you discuss any questions you have with your health care provider.  Document Released: 01/25/2006 Document Revised: 02/06/2019 Document Reviewed: 02/06/2019  Elsevier Patient Education © 2020 [employer] Inc[employer]

## 2021-09-24 NOTE — PROGRESS NOTES
Cardiology Note    Chief Complaint   Patient presents with   • Palpitations   • Hyperlipidemia     F/V Dx: Familial hyperlipidemia   • Tachycardia       History of Present Illness: Rafael Ulysses Zaragoza is a 30 y.o. male PMH HLD, palpitations/PACs/ST who presents for follow up.    This visit patient again with constellation of symptoms. Since last visit presented to ED with palpitations. Since last visit has described his myriad symptoms to other physicians which has earned him more testing including a brain MRI. He attempted to continue his atenolol but this was causing him palpitations which improved as he weaned off he said. His statin that he was taking for FH was causing him stomach discomfort and myalgias which improved after stopping; specifically simvastatin.     Review of Systems   Constitutional: Negative for decreased appetite, fever, malaise/fatigue, weight gain and weight loss.   HENT: Negative for congestion and nosebleeds.    Eyes: Negative for blurred vision.   Cardiovascular: Positive for palpitations. Negative for chest pain, claudication, dyspnea on exertion, irregular heartbeat, leg swelling, near-syncope, orthopnea, paroxysmal nocturnal dyspnea and syncope.   Respiratory: Negative for cough and shortness of breath.    Endocrine: Negative for cold intolerance and heat intolerance.   Skin: Negative for rash.   Musculoskeletal: Negative for back pain.   Gastrointestinal: Negative for abdominal pain, constipation, diarrhea, heartburn, melena, nausea and vomiting.   Genitourinary: Negative for dysuria, flank pain and hematuria.   Neurological: Negative for dizziness.   Psychiatric/Behavioral: Negative for altered mental status and depression.         Past Medical History:   Diagnosis Date   • Hyperlipidemia          Past Surgical History:   Procedure Laterality Date   • DENTAL EXTRACTION(S)  2004         No current outpatient medications on file.     No current facility-administered medications for  this visit.         No Known Allergies      Family History   Problem Relation Age of Onset   • Diabetes Mother    • Diabetes Father    • Heart Disease Father    • No Known Problems Brother    • Cancer Maternal Grandmother         pancreatic   • Stroke Paternal Grandfather          Social History     Socioeconomic History   • Marital status: Single     Spouse name: Not on file   • Number of children: Not on file   • Years of education: Not on file   • Highest education level: Not on file   Occupational History   • Not on file   Tobacco Use   • Smoking status: Never Smoker   • Smokeless tobacco: Never Used   Vaping Use   • Vaping Use: Never used   Substance and Sexual Activity   • Alcohol use: Yes     Alcohol/week: 0.6 oz     Types: 1 Cans of beer per week     Comment: rarely   • Drug use: Never   • Sexual activity: Not Currently   Other Topics Concern   • Not on file   Social History Narrative   • Not on file     Social Determinants of Health     Financial Resource Strain:    • Difficulty of Paying Living Expenses:    Food Insecurity:    • Worried About Running Out of Food in the Last Year:    • Ran Out of Food in the Last Year:    Transportation Needs:    • Lack of Transportation (Medical):    • Lack of Transportation (Non-Medical):    Physical Activity:    • Days of Exercise per Week:    • Minutes of Exercise per Session:    Stress:    • Feeling of Stress :    Social Connections:    • Frequency of Communication with Friends and Family:    • Frequency of Social Gatherings with Friends and Family:    • Attends Mu-[employer] Services:    • Active Member of Clubs or Organizations:    • Attends Club or Organization Meetings:    • Marital Status:    Intimate Partner Violence:    • Fear of Current or Ex-Partner:    • Emotionally Abused:    • Physically Abused:    • Sexually Abused:          Physical Exam:  Ambulatory Vitals  /90 (BP Location: Left arm, Patient Position: Sitting, BP Cuff Size: Adult)   Pulse 94   Resp  "14   Ht 1.727 m (5' 8\")   Wt 74.4 kg (164 lb)   SpO2 96%    BP Readings from Last 4 Encounters:   09/24/21 150/90   01/14/21 118/80   11/18/20 134/80   10/15/20 134/86     Weight/BMI:   Vitals:    09/24/21 1437   BP: 150/90   Weight: 74.4 kg (164 lb)   Height: 1.727 m (5' 8\")    Body mass index is 24.94 kg/m².  Wt Readings from Last 4 Encounters:   09/24/21 74.4 kg (164 lb)   01/14/21 78.2 kg (172 lb 6.4 oz)   11/25/20 78.5 kg (173 lb)   11/18/20 77.1 kg (170 lb)       Physical Exam  Constitutional:       General: He is not in acute distress.  HENT:      Head: Normocephalic and atraumatic.   Eyes:      Conjunctiva/sclera: Conjunctivae normal.      Pupils: Pupils are equal, round, and reactive to light.   Neck:      Vascular: No JVD.   Cardiovascular:      Rate and Rhythm: Normal rate and regular rhythm.      Heart sounds: Normal heart sounds. No murmur heard.   No friction rub. No gallop.    Pulmonary:      Effort: Pulmonary effort is normal. No respiratory distress.      Breath sounds: Normal breath sounds. No wheezing or rales.   Chest:      Chest wall: No tenderness.   Abdominal:      General: Bowel sounds are normal. There is no distension.      Palpations: Abdomen is soft.   Musculoskeletal:      Cervical back: Normal range of motion and neck supple.   Skin:     General: Skin is warm and dry.   Neurological:      Mental Status: He is alert and oriented to person, place, and time.   Psychiatric:         Mood and Affect: Affect normal.         Judgment: Judgment normal.         Lab Data Review:  Lab Results   Component Value Date/Time    CHOLSTRLTOT 190 01/27/2021 09:46 AM     (H) 01/27/2021 09:46 AM    HDL 28 (A) 01/27/2021 09:46 AM    TRIGLYCERIDE 284 (H) 01/27/2021 09:46 AM       Lab Results   Component Value Date/Time    SODIUM 139 11/18/2020 06:10 PM    POTASSIUM 3.7 11/18/2020 06:10 PM    CHLORIDE 100 11/18/2020 06:10 PM    CO2 26 11/18/2020 06:10 PM    GLUCOSE 137 (H) 11/18/2020 06:10 PM    BUN " 11 11/18/2020 06:10 PM    CREATININE 0.69 11/18/2020 06:10 PM    BUNCREATRAT 12 11/16/2018 02:44 PM     CrCl cannot be calculated (Patient's most recent lab result is older than the maximum 7 days allowed.).  Lab Results   Component Value Date/Time    ALKPHOSPHAT 81 11/18/2020 06:10 PM    ASTSGOT 20 11/18/2020 06:10 PM    ALTSGPT 45 11/18/2020 06:10 PM    TBILIRUBIN 0.2 11/18/2020 06:10 PM      Lab Results   Component Value Date/Time    WBC 8.0 11/18/2020 06:10 PM     Lab Results   Component Value Date/Time    HBA1C 5.7 (H) 01/27/2021 09:46 AM     No components found for: TROP      Cardiac Imaging and Procedures Review:      EKG 10/1/20 normal sinus    TTE 09/11/20  CONCLUSIONS  No prior study is available for comparison.   Left ventricular ejection fraction is visually estimated to be 60%.  Normal regional wall motion.  No significant valve abnormalities.     zio monitor 11/2020  Procedure: zio monitor 14 days   Indication: palpitations   Quality: good   Findings:   Underlying rhythm: Predominantly sinus rhythm with average heart rate 78 bpm.   Atrial events: Rare ectopy.   Ventricular events: Rare ectopy.   Patient events: Chest pain/pressure, dizziness, and other associated with sinus rhythm. Skipped/irregular beats and chest pain/pressure associated with ventricular ectopy.   Impressions:   Predominantly sinus rhythm.   Symptomatic ectopy with rare prevalence (<1%).     Medical Decision Making:  Problem List Items Addressed This Visit     Familial hyperlipidemia    Relevant Orders    Lipid Profile        Of greatest concern today described that in my professional opinion he does have some degree of anxiety. Asked that he come up with a method to be evaluated for this. His choice. Something he would be agreeable to where he would also accept the results. Am fearful that this is leading to munchausen and would like to treat primary issue.    History familial hyperlipidemia - he has been off statin. Wait another 3  months and repeat lipids. Treat accordingly.     It was my pleasure to meet with Mr. Reyes.

## 2023-06-13 ENCOUNTER — OFFICE VISIT (OUTPATIENT)
Dept: NEUROLOGY | Facility: MEDICAL CENTER | Age: 33
End: 2023-06-13
Attending: PSYCHIATRY & NEUROLOGY

## 2023-06-13 VITALS
TEMPERATURE: 97.8 F | HEIGHT: 68 IN | WEIGHT: 162.92 LBS | OXYGEN SATURATION: 97 % | SYSTOLIC BLOOD PRESSURE: 132 MMHG | BODY MASS INDEX: 24.69 KG/M2 | DIASTOLIC BLOOD PRESSURE: 72 MMHG | HEART RATE: 90 BPM | RESPIRATION RATE: 16 BRPM

## 2023-06-13 DIAGNOSIS — G44.1 OTHER VASCULAR HEADACHE: Primary | ICD-10-CM

## 2023-06-13 PROCEDURE — 99215 OFFICE O/P EST HI 40 MIN: CPT | Performed by: PSYCHIATRY & NEUROLOGY

## 2023-06-13 PROCEDURE — 99211 OFF/OP EST MAY X REQ PHY/QHP: CPT | Performed by: PSYCHIATRY & NEUROLOGY

## 2023-06-13 PROCEDURE — 3078F DIAST BP <80 MM HG: CPT | Performed by: PSYCHIATRY & NEUROLOGY

## 2023-06-13 PROCEDURE — 3075F SYST BP GE 130 - 139MM HG: CPT | Performed by: PSYCHIATRY & NEUROLOGY

## 2023-06-13 ASSESSMENT — PATIENT HEALTH QUESTIONNAIRE - PHQ9: CLINICAL INTERPRETATION OF PHQ2 SCORE: 0

## 2023-06-13 NOTE — PROGRESS NOTES
"[facility] NEUROLOGY  GENERAL NEUROLOGY  NEW PATIENT VISIT    Referral source: none    CC: \"other headache syndrome...\"    HISTORY OF ILLNESS:  Rafael Ulysses Zaragoza is a 32 y.o. man with a history most notable for pre-diabetes.  Today, he was unaccompanied, and he provided the following history:    Willard wouldn't consider himself a headache person.    9/2020:  He went walking outside in the smoke.  He developed shortness of breath and felt like he might die.  He went to the hospital where workup included testing on the heart.  The results were normal.    He was prescribed simvastatin.  With this drug he seemed to be very sensitive to hunger.  If he didn't eat on time he felt as if he might pass out.  He often used protein drinks to avoid hunger.  With the protein drinks he developed palpitations and felt as if he might pass out.  He called EMS.  EKG was normal with the exception of tachycardia.  He was prescribed atenolol.  Immediately afterward he developed \"strong, pressure headaches.\"    He tapered off atenolol due to the perceived association with headaches.  As he lowered the dosage his palpitations returned.  Around this time he wore a cardiac event monitor.    Two weeks after stopping atenolol he experienced the most recent \"super strong\" headache.\"    Willard saw ENT.  They said it \"wasn't a sinus problem.\"    Lately, he rarely experiences palpitations.    The following is a summary of headache symptoms, presented in my standard format:    Family History:   Age at onset:   Location: lately: behind the nose  Radiation: none  Frequency: constant  Duration: \"constant\" (never goes away, better when he wakes up in the morning, worsens around 14:00-15:00)  Headache Days/Month: 30/30  Quality: \"pressure\"  Intensity: morning: 3/10, can reach: 8-9/10  Aura: none  Photophobia/Phonophobia/Nausea/Vomiting: no/no/no/no  Provoked by Physical Activity?:   Triggers: exertion (anything that got his blood pumping)  Associated " "Symptoms: none  Autonomic Signs (such as ptosis, miosis, conjunctival injection, rhinorrhea, increased lacrimation): none  Head Trauma: none  Association with Menses: n/a  ED Visits: described above  Hospitalizations: none  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     SOCIAL HISTORY:  Social History     Tobacco Use    Smoking status: Never    Smokeless tobacco: Never   Vaping Use    Vaping Use: Never used   Substance Use Topics    Alcohol use: Yes     Alcohol/week: 0.6 oz     Types: 1 Cans of beer per week     Comment: rarely     Social History     Social History Narrative    Not on file     FAMILY HISTORY:  Family History   Problem Relation Age of Onset    Diabetes Mother     Diabetes Father     Heart Disease Father     No Known Problems Brother     Cancer Maternal Grandmother         pancreatic    Stroke Paternal Grandfather      REVIEW OF SYSTEMS:  A ROS was completed.  Pertinent positives and negatives were included in the HPI, above.  All other systems were reviewed and are negative.    PHYSICAL EXAM:  General/Medical:  - NAD  - hair, skin, nails, and joints were normal    Neuro:  MENTAL STATUS: awake and alert; no deficits of speech or language; oriented to conversation; affect was appropriate to situation; pleasant, cooperative    CRANIAL NERVES:    II: acuity: J1+/J1+, fields: intact to confrontation, pupils: 3/3 to 2/2 without a relative " "afferent pupillary defect, discs: sharp    III/IV/VI: versions: intact without nystagmus    V: facial sensation: symmetric to light touch    VII: facial expression: symmetric    VIII: hearing: intact to finger rub    IX/X: palate: elevates symmetrically    XI: shoulder shrug: symmetric    XII: tongue: midline    MOTOR:  - bulk: normal throughout  - tone: normal in the upper extremities  Upper Extremity Strength  (R/L)    5/5   Elbow flexion 5/5   Elbow extension 5/5   Shoulder abduction 5/5     Lower Extremity Strength  (R/L)   Hip flexion 5/5   Knee extension 5/5   Knee flexion 5/5   Ankle plantarflexion 5/5   Ankle dorsiflexion 5/5     - can walk on toes and heels  - pronator drift: absent  - abnormal movements: none    SENSATION:  - light touch: grossly intact over the upper- and lower extremities  - vibration (R/L, seconds): NT/NT at the great toes  - pinprick: NT  - proprioception: NT  - Romberg: absent    COORDINATION:  - finger to nose: normal, no ataxia on exam  - finger tapping: rapid and accurate, bilaterally    REFLEXES:  Reflex Right Left   BR 2+ 2+   Biceps 3+ 3+   Triceps 2+ 2+   Patellae 2+ 2+   Achilles NT NT   Toes NT NT   - Garner's sign: negative    GAIT:  - narrow base and normal  - heel-raised/toe-raised gait: intact/intact  - tandem gait: intact    REVIEW OF IMAGING STUDIES: I reviewed the following studies:  MRI Brain:  Date: 1/26/2021  W/o and w/ contrast?: yes  Indication: \"headache...\"  Comparison: none  Impression:  \"MRI of the brain without and with contrast within normal limits.\"    REVIEW OF LABORATORY STUDIES:  No recent data available.    ASSESSMENT:  Rafael Ulysses Zaragoza is a 32 y.o. man with nonspecific headaches and a history otherwise notable for pre-diabetes.  Willard's headache symptoms are difficult to categorize.  He does not meet criteria for migraine or any of the trigeminal autonomic cephalalgias.  His neurologic exam findings are normal, and MRI brain obtained in " "2021 was unremarkable.  I am not entirely convinced that Willard's pain is \"neurologic\" in origin.  Since the pain is very clearly provoked by exertion I felt compelled to obtain vessel imaging (CTA head/neck).  We will follow up via phone once the imaging results are available.    PLAN:  Sinus Pressure w/ Exertion:  - CTA head/neck    Follow-Up:  - Return if symptoms worsen or fail to improve.    Signed: Margarito Lim M.D.    BILLING DOCUMENTATION:   I spent 53 minutes reviewing the medical record, interviewing and examining the patient, discussing my impression (see \"assessment\" above), and coordinating care.  "

## 2023-06-16 ENCOUNTER — HOSPITAL ENCOUNTER (OUTPATIENT)
Dept: RADIOLOGY | Facility: MEDICAL CENTER | Age: 33
End: 2023-06-16
Attending: PSYCHIATRY & NEUROLOGY

## 2023-06-16 DIAGNOSIS — G44.1 OTHER VASCULAR HEADACHE: ICD-10-CM

## 2023-06-16 PROCEDURE — 70496 CT ANGIOGRAPHY HEAD: CPT

## 2023-06-16 PROCEDURE — 70498 CT ANGIOGRAPHY NECK: CPT

## 2023-06-16 PROCEDURE — 700117 HCHG RX CONTRAST REV CODE 255: Performed by: PSYCHIATRY & NEUROLOGY

## 2023-06-16 RX ADMIN — IOHEXOL 80 ML: 350 INJECTION, SOLUTION INTRAVENOUS at 17:57

## 2023-06-19 ENCOUNTER — PATIENT MESSAGE (OUTPATIENT)
Dept: NEUROLOGY | Facility: MEDICAL CENTER | Age: 33
End: 2023-06-19

## 2023-06-19 DIAGNOSIS — J34.89 SINUS PRESSURE: Primary | ICD-10-CM

## 2023-06-27 ENCOUNTER — HOSPITAL ENCOUNTER (EMERGENCY)
Facility: MEDICAL CENTER | Age: 33
End: 2023-06-27
Attending: EMERGENCY MEDICINE

## 2023-06-27 ENCOUNTER — APPOINTMENT (OUTPATIENT)
Dept: RADIOLOGY | Facility: MEDICAL CENTER | Age: 33
End: 2023-06-27
Attending: EMERGENCY MEDICINE

## 2023-06-27 VITALS
RESPIRATION RATE: 16 BRPM | HEART RATE: 98 BPM | SYSTOLIC BLOOD PRESSURE: 126 MMHG | OXYGEN SATURATION: 96 % | DIASTOLIC BLOOD PRESSURE: 78 MMHG | TEMPERATURE: 97.2 F

## 2023-06-27 DIAGNOSIS — F43.9 STRESS: ICD-10-CM

## 2023-06-27 DIAGNOSIS — R00.2 PALPITATIONS: ICD-10-CM

## 2023-06-27 LAB
ALBUMIN SERPL BCP-MCNC: 4.5 G/DL (ref 3.2–4.9)
ALBUMIN/GLOB SERPL: 1.7 G/DL
ALP SERPL-CCNC: 79 U/L (ref 30–99)
ALT SERPL-CCNC: 37 U/L (ref 2–50)
ANION GAP SERPL CALC-SCNC: 11 MMOL/L (ref 7–16)
AST SERPL-CCNC: 18 U/L (ref 12–45)
BASOPHILS # BLD AUTO: 0.6 % (ref 0–1.8)
BASOPHILS # BLD: 0.05 K/UL (ref 0–0.12)
BILIRUB SERPL-MCNC: 0.4 MG/DL (ref 0.1–1.5)
BUN SERPL-MCNC: 11 MG/DL (ref 8–22)
CALCIUM ALBUM COR SERPL-MCNC: 8.9 MG/DL (ref 8.5–10.5)
CALCIUM SERPL-MCNC: 9.3 MG/DL (ref 8.4–10.2)
CHLORIDE SERPL-SCNC: 103 MMOL/L (ref 96–112)
CO2 SERPL-SCNC: 23 MMOL/L (ref 20–33)
CREAT SERPL-MCNC: 0.69 MG/DL (ref 0.5–1.4)
EKG IMPRESSION: NORMAL
EOSINOPHIL # BLD AUTO: 0.06 K/UL (ref 0–0.51)
EOSINOPHIL NFR BLD: 0.8 % (ref 0–6.9)
ERYTHROCYTE [DISTWIDTH] IN BLOOD BY AUTOMATED COUNT: 40.2 FL (ref 35.9–50)
GFR SERPLBLD CREATININE-BSD FMLA CKD-EPI: 124 ML/MIN/1.73 M 2
GLOBULIN SER CALC-MCNC: 2.7 G/DL (ref 1.9–3.5)
GLUCOSE SERPL-MCNC: 121 MG/DL (ref 65–99)
HCT VFR BLD AUTO: 45.3 % (ref 42–52)
HGB BLD-MCNC: 15.6 G/DL (ref 14–18)
IMM GRANULOCYTES # BLD AUTO: 0.06 K/UL (ref 0–0.11)
IMM GRANULOCYTES NFR BLD AUTO: 0.8 % (ref 0–0.9)
LYMPHOCYTES # BLD AUTO: 1.35 K/UL (ref 1–4.8)
LYMPHOCYTES NFR BLD: 17.2 % (ref 22–41)
MAGNESIUM SERPL-MCNC: 2.1 MG/DL (ref 1.5–2.5)
MCH RBC QN AUTO: 30.6 PG (ref 27–33)
MCHC RBC AUTO-ENTMCNC: 34.4 G/DL (ref 32.3–36.5)
MCV RBC AUTO: 89 FL (ref 81.4–97.8)
MONOCYTES # BLD AUTO: 0.59 K/UL (ref 0–0.85)
MONOCYTES NFR BLD AUTO: 7.5 % (ref 0–13.4)
NEUTROPHILS # BLD AUTO: 5.73 K/UL (ref 1.82–7.42)
NEUTROPHILS NFR BLD: 73.1 % (ref 44–72)
NRBC # BLD AUTO: 0 K/UL
NRBC BLD-RTO: 0 /100 WBC (ref 0–0.2)
PLATELET # BLD AUTO: 298 K/UL (ref 164–446)
PMV BLD AUTO: 9.8 FL (ref 9–12.9)
POTASSIUM SERPL-SCNC: 3.8 MMOL/L (ref 3.6–5.5)
PROT SERPL-MCNC: 7.2 G/DL (ref 6–8.2)
RBC # BLD AUTO: 5.09 M/UL (ref 4.7–6.1)
SODIUM SERPL-SCNC: 137 MMOL/L (ref 135–145)
TROPONIN T SERPL-MCNC: <6 NG/L (ref 6–19)
TSH SERPL DL<=0.005 MIU/L-ACNC: 0.79 UIU/ML (ref 0.38–5.33)
WBC # BLD AUTO: 7.8 K/UL (ref 4.8–10.8)

## 2023-06-27 PROCEDURE — 36415 COLL VENOUS BLD VENIPUNCTURE: CPT

## 2023-06-27 PROCEDURE — 80053 COMPREHEN METABOLIC PANEL: CPT

## 2023-06-27 PROCEDURE — 93005 ELECTROCARDIOGRAM TRACING: CPT | Performed by: EMERGENCY MEDICINE

## 2023-06-27 PROCEDURE — 85025 COMPLETE CBC W/AUTO DIFF WBC: CPT

## 2023-06-27 PROCEDURE — 83735 ASSAY OF MAGNESIUM: CPT

## 2023-06-27 PROCEDURE — 700105 HCHG RX REV CODE 258: Performed by: EMERGENCY MEDICINE

## 2023-06-27 PROCEDURE — 84484 ASSAY OF TROPONIN QUANT: CPT

## 2023-06-27 PROCEDURE — 84443 ASSAY THYROID STIM HORMONE: CPT

## 2023-06-27 PROCEDURE — 71045 X-RAY EXAM CHEST 1 VIEW: CPT

## 2023-06-27 PROCEDURE — 99284 EMERGENCY DEPT VISIT MOD MDM: CPT

## 2023-06-27 RX ORDER — SODIUM CHLORIDE, SODIUM LACTATE, POTASSIUM CHLORIDE, CALCIUM CHLORIDE 600; 310; 30; 20 MG/100ML; MG/100ML; MG/100ML; MG/100ML
1000 INJECTION, SOLUTION INTRAVENOUS ONCE
Status: COMPLETED | OUTPATIENT
Start: 2023-06-27 | End: 2023-06-27

## 2023-06-27 RX ORDER — IBUPROFEN 200 MG
400 TABLET ORAL EVERY 6 HOURS PRN
COMMUNITY

## 2023-06-27 RX ADMIN — SODIUM CHLORIDE, POTASSIUM CHLORIDE, SODIUM LACTATE AND CALCIUM CHLORIDE 1000 ML: 600; 310; 30; 20 INJECTION, SOLUTION INTRAVENOUS at 15:18

## 2023-06-27 NOTE — ED TRIAGE NOTES
"Pt c/o palpitations and dizziness that started this morning. Pt has hx of same and was placed on atenolol but was taken off d/t pressure head aches. Denies CP and SOB. Pt states just \"felt weird\" for 15-20 seconds.   "

## 2023-06-27 NOTE — DISCHARGE INSTRUCTIONS
Your test today showed no dangerous cause for your symptoms.  There does not appear to be any abnormal rhythm with your heart or damage to the heart.  Your thyroid tests are normal as are your electrolytes.  Please follow-up with your primary care doctor and seek more immediate medical attention for any new chest pain, passing out or other concerns

## 2023-06-27 NOTE — ED PROVIDER NOTES
ED Provider Note    CHIEF COMPLAINT  Chief Complaint   Patient presents with    Irregular Heart Beat       EXTERNAL RECORDS REVIEWED  Outpatient Notes from cardiology in September 2021 where he was seen for palpitations hyperlipidemia and tachycardia he was noted to have an EKG October 2020 with normal sinus rhythm, TTE September 2020 that was unremarkable and ejection fraction of 60%, ZIO monitor 11/20/2020 sinus rhythm with rare ectopy was felt symptoms were consistent with anxiety    HPI/ROS  LIMITATION TO HISTORY   Select: : None  OUTSIDE HISTORIAN(S):  none    Rafael Ulysses Zaragoza is a 32 y.o. male who presents with an episode of palpitations.  Patient reports that just prior to arrival while he was walking in his house he began to feel some palpitations in his chest.  He states this lasted for approximately 15 to 30 seconds and now seems to be resolved.  He reports no chest pain or shortness of breath with this.  No syncope.  No recent illness fevers chills cough or congestion, no abdominal pain nausea or vomiting.  He has had a prior episode of this that lasted longer to the point where he felt like he was going to pass out when he was walking in the smoke a few years ago and saw cardiology with an unremarkable work-up    PAST MEDICAL HISTORY   has a past medical history of Hyperlipidemia.    SURGICAL HISTORY   has a past surgical history that includes dental extraction(s) (2004).    FAMILY HISTORY  Family History   Problem Relation Age of Onset    Diabetes Mother     Diabetes Father     Heart Disease Father     No Known Problems Brother     Cancer Maternal Grandmother         pancreatic    Stroke Paternal Grandfather        SOCIAL HISTORY  Social History     Tobacco Use    Smoking status: Never    Smokeless tobacco: Never   Vaping Use    Vaping Use: Never used   Substance and Sexual Activity    Alcohol use: Yes     Alcohol/week: 0.6 oz     Types: 1 Cans of beer per week     Comment: rarely    Drug use:  Never    Sexual activity: Not Currently       CURRENT MEDICATIONS  Home Medications       Reviewed by Brian Joaquin (Pharmacy Tech) on 06/27/23 at 1514  Med List Status: Complete     Medication Last Dose Status   ibuprofen (MOTRIN) 200 MG Tab > 1 week Active   MAGNESIUM PO > 1 week Active                    ALLERGIES  No Known Allergies    PHYSICAL EXAM  VITAL SIGNS: /78   Pulse 98   Temp 36.2 °C (97.2 °F) (Temporal)   Resp 16   SpO2 96%      Pulse ox interpretation: I interpret this pulse ox as normal.  Constitutional: Alert in no apparent distress.  HENT: No signs of trauma, Bilateral external ears normal, Nose normal.   Eyes: Pupils are equal and reactive, Conjunctiva normal, Non-icteric.   Neck: Normal range of motion, No tenderness, Supple, No stridor.   Cardiovascular: Tachycardic, regular rhythm, no murmurs.   Thorax & Lungs: Normal breath sounds, No respiratory distress, No wheezing, No chest tenderness.   Abdomen: Bowel sounds normal, Soft, No tenderness, No masses, No pulsatile masses. No peritoneal signs.  Skin: Warm, Dry, No erythema, No rash.   Back: No bony tenderness, No CVA tenderness.   Extremities: Intact distal pulses, No edema, No tenderness, No cyanosis,  Negative Maddy's sign.   Musculoskeletal: Good range of motion in all major joints. No tenderness to palpation or major deformities noted.   Neurologic: Alert , Normal motor function, Normal sensory function, No focal deficits noted.   Psychiatric: Affect normal, Judgment normal, Mood normal.               DIAGNOSTIC STUDIES / PROCEDURES  Labs Reviewed   CBC WITH DIFFERENTIAL - Abnormal; Notable for the following components:       Result Value    Neutrophils-Polys 73.10 (*)     Lymphocytes 17.20 (*)     All other components within normal limits    Narrative:     Biotin intake of greater than 5 mg per day may interfere with  troponin levels, causing false low values.   COMP METABOLIC PANEL - Abnormal; Notable for the following  components:    Glucose 121 (*)     All other components within normal limits    Narrative:     Biotin intake of greater than 5 mg per day may interfere with  troponin levels, causing false low values.   TROPONIN    Narrative:     Biotin intake of greater than 5 mg per day may interfere with  troponin levels, causing false low values.   MAGNESIUM    Narrative:     Biotin intake of greater than 5 mg per day may interfere with  troponin levels, causing false low values.   TSH WITH REFLEX TO FT4   CORRECTED CALCIUM    Narrative:     Biotin intake of greater than 5 mg per day may interfere with  troponin levels, causing false low values.   ESTIMATED GFR    Narrative:     Biotin intake of greater than 5 mg per day may interfere with  troponin levels, causing false low values.     EKG:   Interpreted by me    Rhythm: Sinus tachycardia 102  Rate: 102  Axis: normal  Intervals: normal  Ectopy: none  Conduction: normal  ST Segments: no acute change  T Waves: no acute change  Q Waves: none  Old EKG from 2020 shows no significant changes.      RADIOLOGY  I have independently interpreted the diagnostic imaging associated with this visit and am waiting the final reading from the radiologist.   My preliminary interpretation is as follows: no edema  Radiologist interpretation:   DX-CHEST-PORTABLE (1 VIEW)   Final Result      No radiographic evidence of acute cardiopulmonary process.            COURSE & MEDICAL DECISION MAKING    ED Observation Status? Yes; I am placing the patient in to an observation status due to a diagnostic uncertainty as well as therapeutic intensity. Patient placed in observation status at 3:08 PM, 6/27/2023.     Observation plan is as follows: Telemetry monitoring given his palpitations    Upon Reevaluation, the patient's condition has: Improved; and will be discharged.    Patient discharged from ED Observation status at 5:01 PM   (Time) 06/27/23   (Date).     INITIAL ASSESSMENT, COURSE AND PLAN  Care  Narrative: 3:08 PM  Patient evaluated at bedside at this point differential includes acute processes such as arrhythmia, electrolyte or metabolic derangement.  Also consideration for stress reaction, seems unlikely to be ACS given his lack of pain and low heart score and nature of his symptoms.  I have ordered for diagnostic labs, ECG, x-ray    HYDRATION: Based on the patient's presentation of Tachycardia the patient was given IV fluids. IV Hydration was used because oral hydration was not as rapid as required. Upon recheck following hydration, the patient was stable.      ADDITIONAL PROBLEM LIST  #Palpitations.  At this point no findings of emergent process, no arrhythmia noted on telemetry monitoring here appropriately genic state.The patient has no valvular abnormality on my examination to suggest valvular cause or hypertrophic cardiomyopathy. The ekg does not show any evidence of arrythmia, prolonged intervals, early excitation, or other abnormality such as an accessory pathway, qt prolongation, or brugada syndrome. ACS or MI are unlikely causes given nature of sx, unremarkable ECG, troponin.There is no evidence of metabolic abnormalities to explain this episode on labs. As the patient is now improved there is no evidence of emergent toxic, metabolic, or endocrine abnormalities.  Patient does endorse stress and this may be contributing and was discussed with him    ADDITIONAL PROBLEM LIST  #Hyperlipidemia, chronic, will follow-up with primary care        DISPOSITION AND DISCUSSIONS    Barriers to care at this time, including but not limited to:  none .     Decision tools and prescription drugs considered including, but not limited to: HEART Score 1 .    The patient will return for new or worsening symptoms and is stable at the time of discharge.    The patient is referred to a primary physician for blood pressure management, diabetic screening, and for all other preventative health  concerns.    DISPOSITION:  Patient will be discharged home in stable condition.    FOLLOW UP:  Roseanna Welsh M.D.  [address]  [place] [address] [phone]    In 1 week        OUTPATIENT MEDICATIONS:  New Prescriptions    No medications on file         FINAL DIAGNOSIS  1. Palpitations    2. Stress           Electronically signed by: Luis Madrigal M.D., 6/27/2023 3:03 PM